# Patient Record
Sex: MALE | Race: WHITE | NOT HISPANIC OR LATINO | Employment: OTHER | RURAL
[De-identification: names, ages, dates, MRNs, and addresses within clinical notes are randomized per-mention and may not be internally consistent; named-entity substitution may affect disease eponyms.]

---

## 2020-01-01 ENCOUNTER — TELEPHONE (OUTPATIENT)
Dept: FAMILY MEDICINE CLINIC | Facility: CLINIC | Age: 85
End: 2020-01-01

## 2020-01-01 ENCOUNTER — ANESTHESIA (OUTPATIENT)
Dept: PERIOP | Facility: HOSPITAL | Age: 85
End: 2020-01-01

## 2020-01-01 ENCOUNTER — TELEMEDICINE (OUTPATIENT)
Dept: FAMILY MEDICINE CLINIC | Facility: CLINIC | Age: 85
End: 2020-01-01

## 2020-01-01 ENCOUNTER — APPOINTMENT (OUTPATIENT)
Dept: GENERAL RADIOLOGY | Facility: HOSPITAL | Age: 85
End: 2020-01-01

## 2020-01-01 ENCOUNTER — NURSING HOME (OUTPATIENT)
Dept: FAMILY MEDICINE CLINIC | Facility: CLINIC | Age: 85
End: 2020-01-01

## 2020-01-01 ENCOUNTER — HOSPITAL ENCOUNTER (INPATIENT)
Facility: HOSPITAL | Age: 85
LOS: 3 days | Discharge: NURSING, REHAB OR LONG-TERM CARE | End: 2020-10-20
Attending: FAMILY MEDICINE | Admitting: ORTHOPAEDIC SURGERY

## 2020-01-01 ENCOUNTER — APPOINTMENT (OUTPATIENT)
Dept: CARDIOLOGY | Facility: HOSPITAL | Age: 85
End: 2020-01-01

## 2020-01-01 ENCOUNTER — ANESTHESIA EVENT (OUTPATIENT)
Dept: PERIOP | Facility: HOSPITAL | Age: 85
End: 2020-01-01

## 2020-01-01 VITALS
TEMPERATURE: 98.2 F | RESPIRATION RATE: 18 BRPM | WEIGHT: 154 LBS | OXYGEN SATURATION: 99 % | HEIGHT: 68 IN | DIASTOLIC BLOOD PRESSURE: 51 MMHG | SYSTOLIC BLOOD PRESSURE: 106 MMHG | BODY MASS INDEX: 23.34 KG/M2 | HEART RATE: 98 BPM

## 2020-01-01 DIAGNOSIS — F03.91 DEMENTIA WITH BEHAVIORAL DISTURBANCE, UNSPECIFIED DEMENTIA TYPE: ICD-10-CM

## 2020-01-01 DIAGNOSIS — Z74.09 IMPAIRED FUNCTIONAL MOBILITY, BALANCE, GAIT, AND ENDURANCE: ICD-10-CM

## 2020-01-01 DIAGNOSIS — H54.8 LEGAL BLINDNESS: ICD-10-CM

## 2020-01-01 DIAGNOSIS — S72.002A LEFT DISPLACED FEMORAL NECK FRACTURE (HCC): Primary | ICD-10-CM

## 2020-01-01 DIAGNOSIS — K59.00 CONSTIPATION, UNSPECIFIED CONSTIPATION TYPE: Primary | ICD-10-CM

## 2020-01-01 DIAGNOSIS — R45.1 AGITATION: ICD-10-CM

## 2020-01-01 DIAGNOSIS — N17.9 ACUTE RENAL FAILURE, UNSPECIFIED ACUTE RENAL FAILURE TYPE (HCC): ICD-10-CM

## 2020-01-01 DIAGNOSIS — L21.9 SEBORRHEA: ICD-10-CM

## 2020-01-01 DIAGNOSIS — Z71.1 CONCERN ABOUT END OF LIFE: ICD-10-CM

## 2020-01-01 DIAGNOSIS — E87.0 HYPERNATREMIA: ICD-10-CM

## 2020-01-01 DIAGNOSIS — R26.9 GAIT DIFFICULTY: ICD-10-CM

## 2020-01-01 DIAGNOSIS — S72.002A LEFT DISPLACED FEMORAL NECK FRACTURE (HCC): ICD-10-CM

## 2020-01-01 DIAGNOSIS — H40.9 GLAUCOMA, UNSPECIFIED GLAUCOMA TYPE, UNSPECIFIED LATERALITY: ICD-10-CM

## 2020-01-01 DIAGNOSIS — Z96.642 STATUS POST LEFT HIP REPLACEMENT: ICD-10-CM

## 2020-01-01 DIAGNOSIS — I95.9 HYPOTENSION, UNSPECIFIED HYPOTENSION TYPE: ICD-10-CM

## 2020-01-01 DIAGNOSIS — R13.10 DYSPHAGIA, UNSPECIFIED TYPE: ICD-10-CM

## 2020-01-01 LAB
ABO GROUP BLD: NORMAL
ALBUMIN SERPL-MCNC: 4 G/DL (ref 3.5–5.2)
ALBUMIN/GLOB SERPL: 1.3 G/DL
ALP SERPL-CCNC: 82 U/L (ref 39–117)
ALT SERPL W P-5'-P-CCNC: 19 U/L (ref 1–41)
ANION GAP SERPL CALCULATED.3IONS-SCNC: 10 MMOL/L (ref 5–15)
ANION GAP SERPL CALCULATED.3IONS-SCNC: 11 MMOL/L (ref 5–15)
ANION GAP SERPL CALCULATED.3IONS-SCNC: 8 MMOL/L (ref 5–15)
ANION GAP SERPL CALCULATED.3IONS-SCNC: 9 MMOL/L (ref 5–15)
ARTERIAL PATENCY WRIST A: ABNORMAL
AST SERPL-CCNC: 28 U/L (ref 1–40)
ATMOSPHERIC PRESS: 757 MMHG
BASE EXCESS BLDA CALC-SCNC: -0.9 MMOL/L (ref 0–2)
BASOPHILS # BLD AUTO: 0.02 10*3/MM3 (ref 0–0.2)
BASOPHILS # BLD AUTO: 0.02 10*3/MM3 (ref 0–0.2)
BASOPHILS # BLD AUTO: 0.03 10*3/MM3 (ref 0–0.2)
BASOPHILS # BLD AUTO: 0.04 10*3/MM3 (ref 0–0.2)
BASOPHILS NFR BLD AUTO: 0.2 % (ref 0–1.5)
BDY SITE: ABNORMAL
BH CV ECHO MEAS - AO MAX PG (FULL): 0.75 MMHG
BH CV ECHO MEAS - AO MAX PG: 4.3 MMHG
BH CV ECHO MEAS - AO MEAN PG (FULL): 0 MMHG
BH CV ECHO MEAS - AO MEAN PG: 2 MMHG
BH CV ECHO MEAS - AO ROOT AREA (BSA CORRECTED): 2
BH CV ECHO MEAS - AO ROOT AREA: 10.2 CM^2
BH CV ECHO MEAS - AO ROOT DIAM: 3.6 CM
BH CV ECHO MEAS - AO V2 MAX: 104 CM/SEC
BH CV ECHO MEAS - AO V2 MEAN: 66.3 CM/SEC
BH CV ECHO MEAS - AO V2 VTI: 15.6 CM
BH CV ECHO MEAS - AVA(I,A): 2.8 CM^2
BH CV ECHO MEAS - AVA(I,D): 2.8 CM^2
BH CV ECHO MEAS - AVA(V,A): 2.9 CM^2
BH CV ECHO MEAS - AVA(V,D): 2.9 CM^2
BH CV ECHO MEAS - BSA(HAYCOCK): 1.8 M^2
BH CV ECHO MEAS - BSA: 1.8 M^2
BH CV ECHO MEAS - BZI_BMI: 23.4 KILOGRAMS/M^2
BH CV ECHO MEAS - BZI_METRIC_HEIGHT: 172.7 CM
BH CV ECHO MEAS - BZI_METRIC_WEIGHT: 69.9 KG
BH CV ECHO MEAS - EDV(CUBED): 81.2 ML
BH CV ECHO MEAS - EDV(MOD-SP4): 63.3 ML
BH CV ECHO MEAS - EDV(TEICH): 84.4 ML
BH CV ECHO MEAS - EF(CUBED): 66.1 %
BH CV ECHO MEAS - EF(MOD-SP4): 63.7 %
BH CV ECHO MEAS - EF(TEICH): 57.9 %
BH CV ECHO MEAS - ESV(CUBED): 27.5 ML
BH CV ECHO MEAS - ESV(MOD-SP4): 23 ML
BH CV ECHO MEAS - ESV(TEICH): 35.6 ML
BH CV ECHO MEAS - FS: 30.3 %
BH CV ECHO MEAS - IVS/LVPW: 1.3
BH CV ECHO MEAS - IVSD: 0.87 CM
BH CV ECHO MEAS - LA DIMENSION: 3.5 CM
BH CV ECHO MEAS - LA/AO: 0.97
BH CV ECHO MEAS - LAT PEAK E' VEL: 6.3 CM/SEC
BH CV ECHO MEAS - LV DIASTOLIC VOL/BSA (35-75): 34.6 ML/M^2
BH CV ECHO MEAS - LV MASS(C)D: 101.8 GRAMS
BH CV ECHO MEAS - LV MASS(C)DI: 55.6 GRAMS/M^2
BH CV ECHO MEAS - LV MAX PG: 3.6 MMHG
BH CV ECHO MEAS - LV MEAN PG: 2 MMHG
BH CV ECHO MEAS - LV SYSTOLIC VOL/BSA (12-30): 12.6 ML/M^2
BH CV ECHO MEAS - LV V1 MAX: 94.6 CM/SEC
BH CV ECHO MEAS - LV V1 MEAN: 60.3 CM/SEC
BH CV ECHO MEAS - LV V1 VTI: 14.1 CM
BH CV ECHO MEAS - LVIDD: 4.3 CM
BH CV ECHO MEAS - LVIDS: 3 CM
BH CV ECHO MEAS - LVLD AP4: 7.3 CM
BH CV ECHO MEAS - LVLS AP4: 6.3 CM
BH CV ECHO MEAS - LVOT AREA (M): 3.1 CM^2
BH CV ECHO MEAS - LVOT AREA: 3.1 CM^2
BH CV ECHO MEAS - LVOT DIAM: 2 CM
BH CV ECHO MEAS - LVPWD: 0.68 CM
BH CV ECHO MEAS - MED PEAK E' VEL: 8 CM/SEC
BH CV ECHO MEAS - MV A MAX VEL: 78.6 CM/SEC
BH CV ECHO MEAS - MV DEC SLOPE: 492.5 CM/SEC^2
BH CV ECHO MEAS - MV DEC TIME: 0.11 SEC
BH CV ECHO MEAS - MV E MAX VEL: 44.1 CM/SEC
BH CV ECHO MEAS - MV E/A: 0.56
BH CV ECHO MEAS - MV P1/2T MAX VEL: 53.5 CM/SEC
BH CV ECHO MEAS - MV P1/2T: 31.8 MSEC
BH CV ECHO MEAS - MVA P1/2T LCG: 4.1 CM^2
BH CV ECHO MEAS - MVA(P1/2T): 6.9 CM^2
BH CV ECHO MEAS - PA MAX PG: 11.2 MMHG
BH CV ECHO MEAS - PA V2 MAX: 167 CM/SEC
BH CV ECHO MEAS - RVDD: 3.7 CM
BH CV ECHO MEAS - SI(AO): 86.8 ML/M^2
BH CV ECHO MEAS - SI(CUBED): 29.3 ML/M^2
BH CV ECHO MEAS - SI(LVOT): 24.2 ML/M^2
BH CV ECHO MEAS - SI(MOD-SP4): 22 ML/M^2
BH CV ECHO MEAS - SI(TEICH): 26.7 ML/M^2
BH CV ECHO MEAS - SV(AO): 158.8 ML
BH CV ECHO MEAS - SV(CUBED): 53.6 ML
BH CV ECHO MEAS - SV(LVOT): 44.3 ML
BH CV ECHO MEAS - SV(MOD-SP4): 40.3 ML
BH CV ECHO MEAS - SV(TEICH): 48.9 ML
BH CV ECHO MEASUREMENTS AVERAGE E/E' RATIO: 6.17
BILIRUB SERPL-MCNC: 0.5 MG/DL (ref 0–1.2)
BILIRUB UR QL STRIP: NEGATIVE
BLD GP AB SCN SERPL QL: NEGATIVE
BODY TEMPERATURE: 37 C
BUN SERPL-MCNC: 15 MG/DL (ref 8–23)
BUN SERPL-MCNC: 18 MG/DL (ref 8–23)
BUN SERPL-MCNC: 19 MG/DL (ref 8–23)
BUN SERPL-MCNC: 22 MG/DL (ref 8–23)
BUN/CREAT SERPL: 15.8 (ref 7–25)
BUN/CREAT SERPL: 17.3 (ref 7–25)
BUN/CREAT SERPL: 21.4 (ref 7–25)
BUN/CREAT SERPL: 22 (ref 7–25)
CALCIUM SPEC-SCNC: 9.2 MG/DL (ref 8.6–10.5)
CALCIUM SPEC-SCNC: 9.4 MG/DL (ref 8.6–10.5)
CHLORIDE SERPL-SCNC: 104 MMOL/L (ref 98–107)
CHLORIDE SERPL-SCNC: 106 MMOL/L (ref 98–107)
CHLORIDE SERPL-SCNC: 110 MMOL/L (ref 98–107)
CHLORIDE SERPL-SCNC: 111 MMOL/L (ref 98–107)
CLARITY UR: CLEAR
CO2 SERPL-SCNC: 19 MMOL/L (ref 22–29)
CO2 SERPL-SCNC: 19 MMOL/L (ref 22–29)
CO2 SERPL-SCNC: 22 MMOL/L (ref 22–29)
CO2 SERPL-SCNC: 23 MMOL/L (ref 22–29)
COLOR UR: YELLOW
CREAT SERPL-MCNC: 0.84 MG/DL (ref 0.76–1.27)
CREAT SERPL-MCNC: 0.95 MG/DL (ref 0.76–1.27)
CREAT SERPL-MCNC: 1 MG/DL (ref 0.76–1.27)
CREAT SERPL-MCNC: 1.1 MG/DL (ref 0.76–1.27)
DEPRECATED RDW RBC AUTO: 47.4 FL (ref 37–54)
DEPRECATED RDW RBC AUTO: 47.8 FL (ref 37–54)
DEPRECATED RDW RBC AUTO: 48 FL (ref 37–54)
DEPRECATED RDW RBC AUTO: 48.2 FL (ref 37–54)
EOSINOPHIL # BLD AUTO: 0 10*3/MM3 (ref 0–0.4)
EOSINOPHIL # BLD AUTO: 0.01 10*3/MM3 (ref 0–0.4)
EOSINOPHIL # BLD AUTO: 0.03 10*3/MM3 (ref 0–0.4)
EOSINOPHIL # BLD AUTO: 0.18 10*3/MM3 (ref 0–0.4)
EOSINOPHIL NFR BLD AUTO: 0 % (ref 0.3–6.2)
EOSINOPHIL NFR BLD AUTO: 0.1 % (ref 0.3–6.2)
EOSINOPHIL NFR BLD AUTO: 0.2 % (ref 0.3–6.2)
EOSINOPHIL NFR BLD AUTO: 1.5 % (ref 0.3–6.2)
ERYTHROCYTE [DISTWIDTH] IN BLOOD BY AUTOMATED COUNT: 13.2 % (ref 12.3–15.4)
ERYTHROCYTE [DISTWIDTH] IN BLOOD BY AUTOMATED COUNT: 13.6 % (ref 12.3–15.4)
GFR SERPL CREATININE-BSD FRML MDRD: 63 ML/MIN/1.73
GFR SERPL CREATININE-BSD FRML MDRD: 71 ML/MIN/1.73
GFR SERPL CREATININE-BSD FRML MDRD: 75 ML/MIN/1.73
GFR SERPL CREATININE-BSD FRML MDRD: 87 ML/MIN/1.73
GLOBULIN UR ELPH-MCNC: 3 GM/DL
GLUCOSE SERPL-MCNC: 108 MG/DL (ref 65–99)
GLUCOSE SERPL-MCNC: 120 MG/DL (ref 65–99)
GLUCOSE SERPL-MCNC: 123 MG/DL (ref 65–99)
GLUCOSE SERPL-MCNC: 131 MG/DL (ref 65–99)
GLUCOSE UR STRIP-MCNC: NEGATIVE MG/DL
HCO3 BLDA-SCNC: 22 MMOL/L (ref 20–26)
HCT VFR BLD AUTO: 35.5 % (ref 37.5–51)
HCT VFR BLD AUTO: 38.6 % (ref 37.5–51)
HCT VFR BLD AUTO: 41.7 % (ref 37.5–51)
HCT VFR BLD AUTO: 43.9 % (ref 37.5–51)
HGB BLD-MCNC: 11.9 G/DL (ref 13–17.7)
HGB BLD-MCNC: 13 G/DL (ref 13–17.7)
HGB BLD-MCNC: 13.8 G/DL (ref 13–17.7)
HGB BLD-MCNC: 14.6 G/DL (ref 13–17.7)
HGB UR QL STRIP.AUTO: NEGATIVE
IMM GRANULOCYTES # BLD AUTO: 0.06 10*3/MM3 (ref 0–0.05)
IMM GRANULOCYTES # BLD AUTO: 0.07 10*3/MM3 (ref 0–0.05)
IMM GRANULOCYTES # BLD AUTO: 0.09 10*3/MM3 (ref 0–0.05)
IMM GRANULOCYTES # BLD AUTO: 0.15 10*3/MM3 (ref 0–0.05)
IMM GRANULOCYTES NFR BLD AUTO: 0.4 % (ref 0–0.5)
IMM GRANULOCYTES NFR BLD AUTO: 0.6 % (ref 0–0.5)
IMM GRANULOCYTES NFR BLD AUTO: 0.7 % (ref 0–0.5)
IMM GRANULOCYTES NFR BLD AUTO: 0.9 % (ref 0–0.5)
INHALED O2 CONCENTRATION: 21 %
KETONES UR QL STRIP: ABNORMAL
LEFT ATRIUM VOLUME INDEX: 28.5 ML/M2
LEUKOCYTE ESTERASE UR QL STRIP.AUTO: NEGATIVE
LYMPHOCYTES # BLD AUTO: 0.8 10*3/MM3 (ref 0.7–3.1)
LYMPHOCYTES # BLD AUTO: 0.91 10*3/MM3 (ref 0.7–3.1)
LYMPHOCYTES # BLD AUTO: 1.06 10*3/MM3 (ref 0.7–3.1)
LYMPHOCYTES # BLD AUTO: 1.25 10*3/MM3 (ref 0.7–3.1)
LYMPHOCYTES NFR BLD AUTO: 10.6 % (ref 19.6–45.3)
LYMPHOCYTES NFR BLD AUTO: 5.6 % (ref 19.6–45.3)
LYMPHOCYTES NFR BLD AUTO: 6.5 % (ref 19.6–45.3)
LYMPHOCYTES NFR BLD AUTO: 7.6 % (ref 19.6–45.3)
Lab: ABNORMAL
MCH RBC QN AUTO: 32 PG (ref 26.6–33)
MCH RBC QN AUTO: 32.1 PG (ref 26.6–33)
MCH RBC QN AUTO: 32.2 PG (ref 26.6–33)
MCH RBC QN AUTO: 32.3 PG (ref 26.6–33)
MCHC RBC AUTO-ENTMCNC: 33.1 G/DL (ref 31.5–35.7)
MCHC RBC AUTO-ENTMCNC: 33.3 G/DL (ref 31.5–35.7)
MCHC RBC AUTO-ENTMCNC: 33.5 G/DL (ref 31.5–35.7)
MCHC RBC AUTO-ENTMCNC: 33.7 G/DL (ref 31.5–35.7)
MCV RBC AUTO: 95.8 FL (ref 79–97)
MCV RBC AUTO: 96.2 FL (ref 79–97)
MCV RBC AUTO: 96.5 FL (ref 79–97)
MCV RBC AUTO: 96.8 FL (ref 79–97)
MODALITY: ABNORMAL
MONOCYTES # BLD AUTO: 1.36 10*3/MM3 (ref 0.1–0.9)
MONOCYTES # BLD AUTO: 1.37 10*3/MM3 (ref 0.1–0.9)
MONOCYTES # BLD AUTO: 1.46 10*3/MM3 (ref 0.1–0.9)
MONOCYTES # BLD AUTO: 1.57 10*3/MM3 (ref 0.1–0.9)
MONOCYTES NFR BLD AUTO: 12.4 % (ref 5–12)
MONOCYTES NFR BLD AUTO: 12.8 % (ref 5–12)
MONOCYTES NFR BLD AUTO: 8.3 % (ref 5–12)
MONOCYTES NFR BLD AUTO: 9.9 % (ref 5–12)
NEUTROPHILS NFR BLD AUTO: 11.31 10*3/MM3 (ref 1.7–7)
NEUTROPHILS NFR BLD AUTO: 13.85 10*3/MM3 (ref 1.7–7)
NEUTROPHILS NFR BLD AUTO: 74.7 % (ref 42.7–76)
NEUTROPHILS NFR BLD AUTO: 79.8 % (ref 42.7–76)
NEUTROPHILS NFR BLD AUTO: 8.82 10*3/MM3 (ref 1.7–7)
NEUTROPHILS NFR BLD AUTO: 81.7 % (ref 42.7–76)
NEUTROPHILS NFR BLD AUTO: 84.9 % (ref 42.7–76)
NEUTROPHILS NFR BLD AUTO: 9.75 10*3/MM3 (ref 1.7–7)
NITRITE UR QL STRIP: NEGATIVE
NRBC BLD AUTO-RTO: 0 /100 WBC (ref 0–0.2)
NT-PROBNP SERPL-MCNC: 436.3 PG/ML (ref 0–1800)
PCO2 BLDA: 31 MM HG (ref 35–45)
PCO2 TEMP ADJ BLD: 31 MM HG (ref 35–45)
PH BLDA: 7.46 PH UNITS (ref 7.35–7.45)
PH UR STRIP.AUTO: 6 [PH] (ref 5–8)
PH, TEMP CORRECTED: 7.46 PH UNITS (ref 7.35–7.45)
PLATELET # BLD AUTO: 159 10*3/MM3 (ref 140–450)
PLATELET # BLD AUTO: 167 10*3/MM3 (ref 140–450)
PLATELET # BLD AUTO: 185 10*3/MM3 (ref 140–450)
PLATELET # BLD AUTO: 189 10*3/MM3 (ref 140–450)
PMV BLD AUTO: 10.6 FL (ref 6–12)
PMV BLD AUTO: 10.8 FL (ref 6–12)
PMV BLD AUTO: 11.2 FL (ref 6–12)
PMV BLD AUTO: 12 FL (ref 6–12)
PO2 BLDA: 85.8 MM HG (ref 83–108)
PO2 TEMP ADJ BLD: 85.8 MM HG (ref 83–108)
POTASSIUM SERPL-SCNC: 3.4 MMOL/L (ref 3.5–5.2)
POTASSIUM SERPL-SCNC: 3.5 MMOL/L (ref 3.5–5.2)
POTASSIUM SERPL-SCNC: 4.1 MMOL/L (ref 3.5–5.2)
POTASSIUM SERPL-SCNC: 4.2 MMOL/L (ref 3.5–5.2)
PROT SERPL-MCNC: 7 G/DL (ref 6–8.5)
PROT UR QL STRIP: ABNORMAL
RBC # BLD AUTO: 3.69 10*6/MM3 (ref 4.14–5.8)
RBC # BLD AUTO: 4.03 10*6/MM3 (ref 4.14–5.8)
RBC # BLD AUTO: 4.31 10*6/MM3 (ref 4.14–5.8)
RBC # BLD AUTO: 4.55 10*6/MM3 (ref 4.14–5.8)
RH BLD: POSITIVE
SAO2 % BLDCOA: 98 % (ref 94–99)
SARS-COV-2 RNA PNL SPEC NAA+PROBE: NOT DETECTED
SARS-COV-2 RNA PNL SPEC NAA+PROBE: NOT DETECTED
SODIUM SERPL-SCNC: 134 MMOL/L (ref 136–145)
SODIUM SERPL-SCNC: 137 MMOL/L (ref 136–145)
SODIUM SERPL-SCNC: 138 MMOL/L (ref 136–145)
SODIUM SERPL-SCNC: 143 MMOL/L (ref 136–145)
SP GR UR STRIP: 1.02 (ref 1–1.03)
T&S EXPIRATION DATE: NORMAL
TSH SERPL DL<=0.05 MIU/L-ACNC: 2.51 UIU/ML (ref 0.27–4.2)
UROBILINOGEN UR QL STRIP: ABNORMAL
VENTILATOR MODE: ABNORMAL
WBC # BLD AUTO: 11.8 10*3/MM3 (ref 3.4–10.8)
WBC # BLD AUTO: 12.23 10*3/MM3 (ref 3.4–10.8)
WBC # BLD AUTO: 13.86 10*3/MM3 (ref 3.4–10.8)
WBC # BLD AUTO: 16.32 10*3/MM3 (ref 3.4–10.8)

## 2020-01-01 PROCEDURE — 80048 BASIC METABOLIC PNL TOTAL CA: CPT | Performed by: ORTHOPAEDIC SURGERY

## 2020-01-01 PROCEDURE — 80053 COMPREHEN METABOLIC PANEL: CPT | Performed by: FAMILY MEDICINE

## 2020-01-01 PROCEDURE — 25010000003 CEFAZOLIN PER 500 MG: Performed by: NURSE ANESTHETIST, CERTIFIED REGISTERED

## 2020-01-01 PROCEDURE — 25010000002 CEFAZOLIN PER 500 MG: Performed by: ORTHOPAEDIC SURGERY

## 2020-01-01 PROCEDURE — 25010000002 PHENYLEPHRINE HCL 0.8 MG/10ML SOLUTION PREFILLED SYRINGE: Performed by: NURSE ANESTHETIST, CERTIFIED REGISTERED

## 2020-01-01 PROCEDURE — 71045 X-RAY EXAM CHEST 1 VIEW: CPT

## 2020-01-01 PROCEDURE — 25010000002 ONDANSETRON PER 1 MG: Performed by: NURSE ANESTHETIST, CERTIFIED REGISTERED

## 2020-01-01 PROCEDURE — 25010000002 VANCOMYCIN 1 G RECONSTITUTED SOLUTION: Performed by: ORTHOPAEDIC SURGERY

## 2020-01-01 PROCEDURE — 99239 HOSP IP/OBS DSCHRG MGMT >30: CPT | Performed by: FAMILY MEDICINE

## 2020-01-01 PROCEDURE — 92610 EVALUATE SWALLOWING FUNCTION: CPT | Performed by: SPEECH-LANGUAGE PATHOLOGIST

## 2020-01-01 PROCEDURE — 25010000002 PERFLUTREN 6.52 MG/ML SUSPENSION: Performed by: FAMILY MEDICINE

## 2020-01-01 PROCEDURE — 81003 URINALYSIS AUTO W/O SCOPE: CPT | Performed by: ORTHOPAEDIC SURGERY

## 2020-01-01 PROCEDURE — 85025 COMPLETE CBC W/AUTO DIFF WBC: CPT | Performed by: ORTHOPAEDIC SURGERY

## 2020-01-01 PROCEDURE — 86901 BLOOD TYPING SEROLOGIC RH(D): CPT | Performed by: ORTHOPAEDIC SURGERY

## 2020-01-01 PROCEDURE — 36600 WITHDRAWAL OF ARTERIAL BLOOD: CPT

## 2020-01-01 PROCEDURE — 85025 COMPLETE CBC W/AUTO DIFF WBC: CPT | Performed by: FAMILY MEDICINE

## 2020-01-01 PROCEDURE — 87635 SARS-COV-2 COVID-19 AMP PRB: CPT | Performed by: FAMILY MEDICINE

## 2020-01-01 PROCEDURE — 99232 SBSQ HOSP IP/OBS MODERATE 35: CPT | Performed by: FAMILY MEDICINE

## 2020-01-01 PROCEDURE — 93306 TTE W/DOPPLER COMPLETE: CPT | Performed by: INTERNAL MEDICINE

## 2020-01-01 PROCEDURE — 99443 PR PHYS/QHP TELEPHONE EVALUATION 21-30 MIN: CPT | Performed by: FAMILY MEDICINE

## 2020-01-01 PROCEDURE — 99305 1ST NF CARE MODERATE MDM 35: CPT | Performed by: FAMILY MEDICINE

## 2020-01-01 PROCEDURE — 93010 ELECTROCARDIOGRAM REPORT: CPT | Performed by: INTERNAL MEDICINE

## 2020-01-01 PROCEDURE — 86850 RBC ANTIBODY SCREEN: CPT | Performed by: ORTHOPAEDIC SURGERY

## 2020-01-01 PROCEDURE — 93306 TTE W/DOPPLER COMPLETE: CPT

## 2020-01-01 PROCEDURE — 76000 FLUOROSCOPY <1 HR PHYS/QHP: CPT

## 2020-01-01 PROCEDURE — 0SRS0JA REPLACEMENT OF LEFT HIP JOINT, FEMORAL SURFACE WITH SYNTHETIC SUBSTITUTE, UNCEMENTED, OPEN APPROACH: ICD-10-PCS | Performed by: ORTHOPAEDIC SURGERY

## 2020-01-01 PROCEDURE — 82803 BLOOD GASES ANY COMBINATION: CPT

## 2020-01-01 PROCEDURE — 97166 OT EVAL MOD COMPLEX 45 MIN: CPT

## 2020-01-01 PROCEDURE — 73502 X-RAY EXAM HIP UNI 2-3 VIEWS: CPT

## 2020-01-01 PROCEDURE — 99222 1ST HOSP IP/OBS MODERATE 55: CPT | Performed by: INTERNAL MEDICINE

## 2020-01-01 PROCEDURE — 97162 PT EVAL MOD COMPLEX 30 MIN: CPT

## 2020-01-01 PROCEDURE — 99222 1ST HOSP IP/OBS MODERATE 55: CPT | Performed by: FAMILY MEDICINE

## 2020-01-01 PROCEDURE — 25010000002 ROPIVACAINE PER 1 MG: Performed by: NURSE ANESTHETIST, CERTIFIED REGISTERED

## 2020-01-01 PROCEDURE — 97530 THERAPEUTIC ACTIVITIES: CPT

## 2020-01-01 PROCEDURE — 25010000002 FENTANYL CITRATE (PF) 100 MCG/2ML SOLUTION: Performed by: NURSE ANESTHETIST, CERTIFIED REGISTERED

## 2020-01-01 PROCEDURE — 86900 BLOOD TYPING SEROLOGIC ABO: CPT | Performed by: ORTHOPAEDIC SURGERY

## 2020-01-01 PROCEDURE — 25010000002 DEXAMETHASONE PER 1 MG: Performed by: NURSE ANESTHETIST, CERTIFIED REGISTERED

## 2020-01-01 PROCEDURE — 83880 ASSAY OF NATRIURETIC PEPTIDE: CPT | Performed by: FAMILY MEDICINE

## 2020-01-01 PROCEDURE — 93005 ELECTROCARDIOGRAM TRACING: CPT | Performed by: ORTHOPAEDIC SURGERY

## 2020-01-01 PROCEDURE — 97110 THERAPEUTIC EXERCISES: CPT

## 2020-01-01 PROCEDURE — 99308 SBSQ NF CARE LOW MDM 20: CPT | Performed by: NURSE PRACTITIONER

## 2020-01-01 PROCEDURE — 25010000002 PROPOFOL 10 MG/ML EMULSION: Performed by: NURSE ANESTHETIST, CERTIFIED REGISTERED

## 2020-01-01 PROCEDURE — 25010000002 MORPHINE SULFATE (PF) 2 MG/ML SOLUTION: Performed by: FAMILY MEDICINE

## 2020-01-01 PROCEDURE — 80048 BASIC METABOLIC PNL TOTAL CA: CPT | Performed by: FAMILY MEDICINE

## 2020-01-01 PROCEDURE — 94799 UNLISTED PULMONARY SVC/PX: CPT

## 2020-01-01 PROCEDURE — C1776 JOINT DEVICE (IMPLANTABLE): HCPCS | Performed by: ORTHOPAEDIC SURGERY

## 2020-01-01 PROCEDURE — 84443 ASSAY THYROID STIM HORMONE: CPT | Performed by: FAMILY MEDICINE

## 2020-01-01 DEVICE — CORAIL HIP SYSTEM CEMENTLESS FEMORAL STEM 12/14 AMT 135 DEGREES KA SIZE 12 HA COATED STANDARD COLLAR
Type: IMPLANTABLE DEVICE | Status: FUNCTIONAL
Brand: CORAIL

## 2020-01-01 DEVICE — MODULAR CATHCART FRACTURE HEAD HIP BALL 52MM OD +5MM: Type: IMPLANTABLE DEVICE | Status: FUNCTIONAL

## 2020-01-01 DEVICE — MODULAR CATHCART TAPERED SPACER 12/14 TAPER -3MM: Type: IMPLANTABLE DEVICE | Status: FUNCTIONAL

## 2020-01-01 DEVICE — CAP BIPOL HIP CORAIL ACTIS: Type: IMPLANTABLE DEVICE | Status: FUNCTIONAL

## 2020-01-01 DEVICE — SEAL HEMO SURG ARISTA/AH ABS/PWDR 3GM: Type: IMPLANTABLE DEVICE | Status: FUNCTIONAL

## 2020-01-01 RX ORDER — DIVALPROEX SODIUM 125 MG/1
125 CAPSULE, COATED PELLETS ORAL EVERY MORNING
COMMUNITY

## 2020-01-01 RX ORDER — ROPIVACAINE HYDROCHLORIDE 2 MG/ML
INJECTION, SOLUTION EPIDURAL; INFILTRATION; PERINEURAL
Status: COMPLETED | OUTPATIENT
Start: 2020-01-01 | End: 2020-01-01

## 2020-01-01 RX ORDER — ONDANSETRON 4 MG/1
4 TABLET, FILM COATED ORAL EVERY 8 HOURS PRN
Qty: 20 TABLET | Refills: 1 | Status: SHIPPED | OUTPATIENT
Start: 2020-01-01

## 2020-01-01 RX ORDER — BUPIVACAINE HCL/0.9 % NACL/PF 0.1 %
2 PLASTIC BAG, INJECTION (ML) EPIDURAL EVERY 8 HOURS
Status: COMPLETED | OUTPATIENT
Start: 2020-01-01 | End: 2020-01-01

## 2020-01-01 RX ORDER — OXYCODONE AND ACETAMINOPHEN 10; 325 MG/1; MG/1
1 TABLET ORAL ONCE AS NEEDED
Status: DISCONTINUED | OUTPATIENT
Start: 2020-01-01 | End: 2020-01-01 | Stop reason: HOSPADM

## 2020-01-01 RX ORDER — NEOSTIGMINE METHYLSULFATE 5 MG/5 ML
SYRINGE (ML) INTRAVENOUS AS NEEDED
Status: DISCONTINUED | OUTPATIENT
Start: 2020-01-01 | End: 2020-01-01 | Stop reason: SURG

## 2020-01-01 RX ORDER — FENTANYL CITRATE 50 UG/ML
25 INJECTION, SOLUTION INTRAMUSCULAR; INTRAVENOUS
Status: DISCONTINUED | OUTPATIENT
Start: 2020-01-01 | End: 2020-01-01 | Stop reason: HOSPADM

## 2020-01-01 RX ORDER — ACETAMINOPHEN 325 MG/1
650 TABLET ORAL EVERY 4 HOURS PRN
Status: DISCONTINUED | OUTPATIENT
Start: 2020-01-01 | End: 2020-01-01 | Stop reason: HOSPADM

## 2020-01-01 RX ORDER — SODIUM CHLORIDE 9 MG/ML
100 INJECTION, SOLUTION INTRAVENOUS CONTINUOUS
Status: DISCONTINUED | OUTPATIENT
Start: 2020-01-01 | End: 2020-01-01

## 2020-01-01 RX ORDER — NALOXONE HCL 0.4 MG/ML
0.04 VIAL (ML) INJECTION AS NEEDED
Status: DISCONTINUED | OUTPATIENT
Start: 2020-01-01 | End: 2020-01-01 | Stop reason: HOSPADM

## 2020-01-01 RX ORDER — HYDROCODONE BITARTRATE AND ACETAMINOPHEN 5; 325 MG/1; MG/1
2 TABLET ORAL EVERY 4 HOURS PRN
Status: DISCONTINUED | OUTPATIENT
Start: 2020-01-01 | End: 2020-01-01 | Stop reason: HOSPADM

## 2020-01-01 RX ORDER — TRANEXAMIC ACID 100 MG/ML
INJECTION, SOLUTION INTRAVENOUS AS NEEDED
Status: DISCONTINUED | OUTPATIENT
Start: 2020-01-01 | End: 2020-01-01 | Stop reason: SURG

## 2020-01-01 RX ORDER — SODIUM CHLORIDE, SODIUM LACTATE, POTASSIUM CHLORIDE, CALCIUM CHLORIDE 600; 310; 30; 20 MG/100ML; MG/100ML; MG/100ML; MG/100ML
INJECTION, SOLUTION INTRAVENOUS CONTINUOUS PRN
Status: DISCONTINUED | OUTPATIENT
Start: 2020-01-01 | End: 2020-01-01 | Stop reason: SURG

## 2020-01-01 RX ORDER — NALOXONE HCL 0.4 MG/ML
0.1 VIAL (ML) INJECTION
Status: DISCONTINUED | OUTPATIENT
Start: 2020-01-01 | End: 2020-01-01 | Stop reason: SDUPTHER

## 2020-01-01 RX ORDER — FENTANYL CITRATE 50 UG/ML
INJECTION, SOLUTION INTRAMUSCULAR; INTRAVENOUS AS NEEDED
Status: DISCONTINUED | OUTPATIENT
Start: 2020-01-01 | End: 2020-01-01 | Stop reason: SURG

## 2020-01-01 RX ORDER — BISACODYL 10 MG
10 SUPPOSITORY, RECTAL RECTAL DAILY PRN
COMMUNITY

## 2020-01-01 RX ORDER — ACETAMINOPHEN 325 MG/1
650 TABLET ORAL EVERY 4 HOURS PRN
Status: DISCONTINUED | OUTPATIENT
Start: 2020-01-01 | End: 2020-01-01 | Stop reason: SDUPTHER

## 2020-01-01 RX ORDER — TRAVOPROST OPHTHALMIC SOLUTION 0.04 MG/ML
1 SOLUTION OPHTHALMIC EVERY EVENING
COMMUNITY

## 2020-01-01 RX ORDER — NALOXONE HCL 0.4 MG/ML
0.4 VIAL (ML) INJECTION
Status: DISCONTINUED | OUTPATIENT
Start: 2020-01-01 | End: 2020-01-01 | Stop reason: HOSPADM

## 2020-01-01 RX ORDER — DIVALPROEX SODIUM 125 MG/1
250 TABLET, DELAYED RELEASE ORAL NIGHTLY
Status: DISCONTINUED | OUTPATIENT
Start: 2020-01-01 | End: 2020-01-01 | Stop reason: HOSPADM

## 2020-01-01 RX ORDER — PROPOFOL 10 MG/ML
VIAL (ML) INTRAVENOUS AS NEEDED
Status: DISCONTINUED | OUTPATIENT
Start: 2020-01-01 | End: 2020-01-01 | Stop reason: SURG

## 2020-01-01 RX ORDER — ACETAMINOPHEN 650 MG/1
650 SUPPOSITORY RECTAL EVERY 4 HOURS PRN
Status: DISCONTINUED | OUTPATIENT
Start: 2020-01-01 | End: 2020-01-01 | Stop reason: HOSPADM

## 2020-01-01 RX ORDER — DIVALPROEX SODIUM 125 MG/1
250 CAPSULE, COATED PELLETS ORAL NIGHTLY
COMMUNITY

## 2020-01-01 RX ORDER — BISACODYL 5 MG/1
10 TABLET, DELAYED RELEASE ORAL DAILY PRN
Status: DISCONTINUED | OUTPATIENT
Start: 2020-01-01 | End: 2020-01-01 | Stop reason: HOSPADM

## 2020-01-01 RX ORDER — ROCURONIUM BROMIDE 10 MG/ML
INJECTION, SOLUTION INTRAVENOUS AS NEEDED
Status: DISCONTINUED | OUTPATIENT
Start: 2020-01-01 | End: 2020-01-01 | Stop reason: SURG

## 2020-01-01 RX ORDER — NALOXONE HCL 0.4 MG/ML
0.4 VIAL (ML) INJECTION
Status: DISCONTINUED | OUTPATIENT
Start: 2020-01-01 | End: 2020-01-01 | Stop reason: SDUPTHER

## 2020-01-01 RX ORDER — HYDROCODONE BITARTRATE AND ACETAMINOPHEN 5; 325 MG/1; MG/1
1 TABLET ORAL EVERY 4 HOURS PRN
Qty: 60 TABLET | Refills: 0 | Status: SHIPPED | OUTPATIENT
Start: 2020-01-01 | End: 2020-11-15

## 2020-01-01 RX ORDER — VANCOMYCIN HYDROCHLORIDE 1 G/20ML
INJECTION, POWDER, LYOPHILIZED, FOR SOLUTION INTRAVENOUS AS NEEDED
Status: DISCONTINUED | OUTPATIENT
Start: 2020-01-01 | End: 2020-01-01 | Stop reason: HOSPADM

## 2020-01-01 RX ORDER — ACETAMINOPHEN 325 MG/1
650 TABLET ORAL EVERY 4 HOURS PRN
COMMUNITY

## 2020-01-01 RX ORDER — DIVALPROEX SODIUM 125 MG/1
125 TABLET, DELAYED RELEASE ORAL EVERY MORNING
Status: DISCONTINUED | OUTPATIENT
Start: 2020-01-01 | End: 2020-01-01 | Stop reason: HOSPADM

## 2020-01-01 RX ORDER — DONEPEZIL HYDROCHLORIDE 10 MG/1
10 TABLET, FILM COATED ORAL NIGHTLY
Status: ON HOLD | COMMUNITY
End: 2020-01-01

## 2020-01-01 RX ORDER — MAGNESIUM HYDROXIDE/ALUMINUM HYDROXICE/SIMETHICONE 120; 1200; 1200 MG/30ML; MG/30ML; MG/30ML
30 SUSPENSION ORAL EVERY 4 HOURS PRN
COMMUNITY

## 2020-01-01 RX ORDER — DONEPEZIL HYDROCHLORIDE 10 MG/1
10 TABLET, FILM COATED ORAL NIGHTLY
Status: DISCONTINUED | OUTPATIENT
Start: 2020-01-01 | End: 2020-01-01

## 2020-01-01 RX ORDER — HYDROCODONE BITARTRATE AND ACETAMINOPHEN 5; 325 MG/1; MG/1
1 TABLET ORAL EVERY 4 HOURS PRN
Status: DISCONTINUED | OUTPATIENT
Start: 2020-01-01 | End: 2020-01-01 | Stop reason: HOSPADM

## 2020-01-01 RX ORDER — HYDROCODONE BITARTRATE AND ACETAMINOPHEN 5; 325 MG/1; MG/1
1 TABLET ORAL EVERY 4 HOURS PRN
Qty: 60 TABLET | Refills: 0 | Status: SHIPPED | OUTPATIENT
Start: 2020-01-01 | End: 2020-01-01 | Stop reason: SDUPTHER

## 2020-01-01 RX ORDER — FLUMAZENIL 0.1 MG/ML
0.2 INJECTION INTRAVENOUS AS NEEDED
Status: DISCONTINUED | OUTPATIENT
Start: 2020-01-01 | End: 2020-01-01 | Stop reason: HOSPADM

## 2020-01-01 RX ORDER — MULTIPLE VITAMINS W/ MINERALS TAB 9MG-400MCG
1 TAB ORAL DAILY
COMMUNITY

## 2020-01-01 RX ORDER — PSEUDOEPHEDRINE HCL 30 MG
100 TABLET ORAL 2 TIMES DAILY
Start: 2020-01-01

## 2020-01-01 RX ORDER — ONDANSETRON 2 MG/ML
4 INJECTION INTRAMUSCULAR; INTRAVENOUS EVERY 6 HOURS PRN
Status: DISCONTINUED | OUTPATIENT
Start: 2020-01-01 | End: 2020-01-01 | Stop reason: HOSPADM

## 2020-01-01 RX ORDER — SODIUM CHLORIDE 0.9 % (FLUSH) 0.9 %
10 SYRINGE (ML) INJECTION EVERY 12 HOURS SCHEDULED
Status: DISCONTINUED | OUTPATIENT
Start: 2020-01-01 | End: 2020-01-01 | Stop reason: HOSPADM

## 2020-01-01 RX ORDER — MORPHINE SULFATE 2 MG/ML
2 INJECTION, SOLUTION INTRAMUSCULAR; INTRAVENOUS
Status: DISCONTINUED | OUTPATIENT
Start: 2020-01-01 | End: 2020-01-01 | Stop reason: HOSPADM

## 2020-01-01 RX ORDER — BISACODYL 10 MG
10 SUPPOSITORY, RECTAL RECTAL DAILY PRN
Status: DISCONTINUED | OUTPATIENT
Start: 2020-01-01 | End: 2020-01-01 | Stop reason: HOSPADM

## 2020-01-01 RX ORDER — LATANOPROST 50 UG/ML
1 SOLUTION/ DROPS OPHTHALMIC NIGHTLY
Status: DISCONTINUED | OUTPATIENT
Start: 2020-01-01 | End: 2020-01-01 | Stop reason: HOSPADM

## 2020-01-01 RX ORDER — PHENYLEPHRINE HCL IN 0.9% NACL 0.8MG/10ML
SYRINGE (ML) INTRAVENOUS AS NEEDED
Status: DISCONTINUED | OUTPATIENT
Start: 2020-01-01 | End: 2020-01-01 | Stop reason: SURG

## 2020-01-01 RX ORDER — SODIUM CHLORIDE 0.9 % (FLUSH) 0.9 %
3 SYRINGE (ML) INJECTION EVERY 12 HOURS SCHEDULED
Status: DISCONTINUED | OUTPATIENT
Start: 2020-01-01 | End: 2020-01-01 | Stop reason: HOSPADM

## 2020-01-01 RX ORDER — DOCUSATE SODIUM 100 MG/1
100 CAPSULE, LIQUID FILLED ORAL 2 TIMES DAILY
Qty: 60 CAPSULE | Refills: 1 | Status: SHIPPED | OUTPATIENT
Start: 2020-01-01

## 2020-01-01 RX ORDER — SODIUM CHLORIDE 0.9 % (FLUSH) 0.9 %
10 SYRINGE (ML) INJECTION AS NEEDED
Status: DISCONTINUED | OUTPATIENT
Start: 2020-01-01 | End: 2020-01-01 | Stop reason: HOSPADM

## 2020-01-01 RX ORDER — LABETALOL HYDROCHLORIDE 5 MG/ML
5 INJECTION, SOLUTION INTRAVENOUS
Status: DISCONTINUED | OUTPATIENT
Start: 2020-01-01 | End: 2020-01-01 | Stop reason: HOSPADM

## 2020-01-01 RX ORDER — HYDROMORPHONE HYDROCHLORIDE 1 MG/ML
0.5 INJECTION, SOLUTION INTRAMUSCULAR; INTRAVENOUS; SUBCUTANEOUS
Status: DISCONTINUED | OUTPATIENT
Start: 2020-01-01 | End: 2020-01-01 | Stop reason: ALTCHOICE

## 2020-01-01 RX ORDER — ONDANSETRON 2 MG/ML
4 INJECTION INTRAMUSCULAR; INTRAVENOUS AS NEEDED
Status: DISCONTINUED | OUTPATIENT
Start: 2020-01-01 | End: 2020-01-01 | Stop reason: HOSPADM

## 2020-01-01 RX ORDER — ONDANSETRON 2 MG/ML
INJECTION INTRAMUSCULAR; INTRAVENOUS AS NEEDED
Status: DISCONTINUED | OUTPATIENT
Start: 2020-01-01 | End: 2020-01-01 | Stop reason: SURG

## 2020-01-01 RX ORDER — KETOCONAZOLE 20 MG/ML
1 SHAMPOO TOPICAL EVERY 12 HOURS PRN
COMMUNITY

## 2020-01-01 RX ORDER — MAGNESIUM HYDROXIDE 1200 MG/15ML
LIQUID ORAL AS NEEDED
Status: DISCONTINUED | OUTPATIENT
Start: 2020-01-01 | End: 2020-01-01 | Stop reason: HOSPADM

## 2020-01-01 RX ORDER — OXYCODONE AND ACETAMINOPHEN 7.5; 325 MG/1; MG/1
2 TABLET ORAL ONCE AS NEEDED
Status: DISCONTINUED | OUTPATIENT
Start: 2020-01-01 | End: 2020-01-01 | Stop reason: HOSPADM

## 2020-01-01 RX ORDER — POLYETHYLENE GLYCOL 3350 17 G/17G
17 POWDER, FOR SOLUTION ORAL DAILY PRN
Status: DISCONTINUED | OUTPATIENT
Start: 2020-01-01 | End: 2020-01-01 | Stop reason: HOSPADM

## 2020-01-01 RX ORDER — POTASSIUM CHLORIDE 750 MG/1
2 CAPSULE, EXTENDED RELEASE ORAL DAILY
COMMUNITY

## 2020-01-01 RX ORDER — CEFAZOLIN SODIUM 1 G/3ML
INJECTION, POWDER, FOR SOLUTION INTRAMUSCULAR; INTRAVENOUS AS NEEDED
Status: DISCONTINUED | OUTPATIENT
Start: 2020-01-01 | End: 2020-01-01 | Stop reason: SURG

## 2020-01-01 RX ORDER — MORPHINE SULFATE 2 MG/ML
1 INJECTION, SOLUTION INTRAMUSCULAR; INTRAVENOUS EVERY 4 HOURS PRN
Status: DISCONTINUED | OUTPATIENT
Start: 2020-01-01 | End: 2020-01-01 | Stop reason: SDUPTHER

## 2020-01-01 RX ORDER — BUPIVACAINE HCL/0.9 % NACL/PF 0.1 %
2 PLASTIC BAG, INJECTION (ML) EPIDURAL ONCE
Status: CANCELLED | OUTPATIENT
Start: 2020-01-01 | End: 2020-01-01

## 2020-01-01 RX ORDER — ONDANSETRON 4 MG/1
4 TABLET, FILM COATED ORAL EVERY 6 HOURS PRN
Status: DISCONTINUED | OUTPATIENT
Start: 2020-01-01 | End: 2020-01-01 | Stop reason: HOSPADM

## 2020-01-01 RX ORDER — DEXAMETHASONE SODIUM PHOSPHATE 4 MG/ML
INJECTION, SOLUTION INTRA-ARTICULAR; INTRALESIONAL; INTRAMUSCULAR; INTRAVENOUS; SOFT TISSUE AS NEEDED
Status: DISCONTINUED | OUTPATIENT
Start: 2020-01-01 | End: 2020-01-01 | Stop reason: SURG

## 2020-01-01 RX ORDER — DOCUSATE SODIUM 100 MG/1
100 CAPSULE, LIQUID FILLED ORAL 2 TIMES DAILY
Status: DISCONTINUED | OUTPATIENT
Start: 2020-01-01 | End: 2020-01-01 | Stop reason: HOSPADM

## 2020-01-01 RX ADMIN — Medication 3 MG: at 10:40

## 2020-01-01 RX ADMIN — CEFAZOLIN SODIUM 2 G: 10 INJECTION, POWDER, FOR SOLUTION INTRAVENOUS at 17:12

## 2020-01-01 RX ADMIN — LIDOCAINE HYDROCHLORIDE 100 MG: 20 INJECTION, SOLUTION INTRAVENOUS at 09:33

## 2020-01-01 RX ADMIN — FENTANYL CITRATE 25 MCG: 50 INJECTION, SOLUTION INTRAMUSCULAR; INTRAVENOUS at 10:25

## 2020-01-01 RX ADMIN — BISACODYL 10 MG: 10 SUPPOSITORY RECTAL at 09:58

## 2020-01-01 RX ADMIN — FENTANYL CITRATE 25 MCG: 50 INJECTION, SOLUTION INTRAMUSCULAR; INTRAVENOUS at 10:15

## 2020-01-01 RX ADMIN — SODIUM CHLORIDE 100 ML/HR: 9 INJECTION, SOLUTION INTRAVENOUS at 12:05

## 2020-01-01 RX ADMIN — DIVALPROEX SODIUM 125 MG: 125 TABLET, DELAYED RELEASE ORAL at 08:45

## 2020-01-01 RX ADMIN — VASOPRESSIN 1 ML: 20 INJECTION INTRAVENOUS at 10:45

## 2020-01-01 RX ADMIN — SODIUM CHLORIDE, PRESERVATIVE FREE 3 ML: 5 INJECTION INTRAVENOUS at 21:48

## 2020-01-01 RX ADMIN — SODIUM CHLORIDE, POTASSIUM CHLORIDE, SODIUM LACTATE AND CALCIUM CHLORIDE: 600; 310; 30; 20 INJECTION, SOLUTION INTRAVENOUS at 10:30

## 2020-01-01 RX ADMIN — SODIUM CHLORIDE, PRESERVATIVE FREE 10 ML: 5 INJECTION INTRAVENOUS at 21:47

## 2020-01-01 RX ADMIN — LATANOPROST 1 DROP: 50 SOLUTION OPHTHALMIC at 21:47

## 2020-01-01 RX ADMIN — VASOPRESSIN 1 ML: 20 INJECTION INTRAVENOUS at 10:26

## 2020-01-01 RX ADMIN — SENNOSIDES 10 ML: 8.8 SYRUP ORAL at 20:48

## 2020-01-01 RX ADMIN — LATANOPROST 1 DROP: 50 SOLUTION OPHTHALMIC at 20:48

## 2020-01-01 RX ADMIN — DIVALPROEX SODIUM 250 MG: 125 TABLET, DELAYED RELEASE ORAL at 21:46

## 2020-01-01 RX ADMIN — APIXABAN 2.5 MG: 2.5 TABLET, FILM COATED ORAL at 08:45

## 2020-01-01 RX ADMIN — POLYETHYLENE GLYCOL (3350) 17 G: 17 POWDER, FOR SOLUTION ORAL at 20:47

## 2020-01-01 RX ADMIN — DIVALPROEX SODIUM 125 MG: 125 TABLET, DELAYED RELEASE ORAL at 09:59

## 2020-01-01 RX ADMIN — PROPOFOL 150 MG: 10 INJECTION, EMULSION INTRAVENOUS at 09:33

## 2020-01-01 RX ADMIN — PERFLUTREN 8.48 MG: 6.52 INJECTION, SUSPENSION INTRAVENOUS at 15:21

## 2020-01-01 RX ADMIN — ACETAMINOPHEN 650 MG: 650 SUPPOSITORY RECTAL at 16:28

## 2020-01-01 RX ADMIN — ROPIVACAINE HYDROCHLORIDE 40 ML: 2 INJECTION, SOLUTION EPIDURAL; INFILTRATION at 10:54

## 2020-01-01 RX ADMIN — Medication 240 MCG: at 09:37

## 2020-01-01 RX ADMIN — SODIUM CHLORIDE 100 ML/HR: 9 INJECTION, SOLUTION INTRAVENOUS at 21:44

## 2020-01-01 RX ADMIN — DIVALPROEX SODIUM 250 MG: 125 TABLET, DELAYED RELEASE ORAL at 20:48

## 2020-01-01 RX ADMIN — CEFAZOLIN SODIUM 2 G: 10 INJECTION, POWDER, FOR SOLUTION INTRAVENOUS at 01:33

## 2020-01-01 RX ADMIN — DOCUSATE SODIUM 100 MG: 100 CAPSULE ORAL at 08:45

## 2020-01-01 RX ADMIN — DEXAMETHASONE SODIUM PHOSPHATE 8 MG: 4 INJECTION, SOLUTION INTRAMUSCULAR; INTRAVENOUS at 10:01

## 2020-01-01 RX ADMIN — VASOPRESSIN 1 ML: 20 INJECTION INTRAVENOUS at 10:19

## 2020-01-01 RX ADMIN — APIXABAN 2.5 MG: 2.5 TABLET, FILM COATED ORAL at 20:50

## 2020-01-01 RX ADMIN — TRANEXAMIC ACID 1000 MG: 100 INJECTION, SOLUTION INTRAVENOUS at 10:40

## 2020-01-01 RX ADMIN — MORPHINE SULFATE 1 MG: 2 INJECTION, SOLUTION INTRAMUSCULAR; INTRAVENOUS at 14:43

## 2020-01-01 RX ADMIN — FENTANYL CITRATE 50 MCG: 50 INJECTION, SOLUTION INTRAMUSCULAR; INTRAVENOUS at 09:29

## 2020-01-01 RX ADMIN — SODIUM CHLORIDE, PRESERVATIVE FREE 10 ML: 5 INJECTION INTRAVENOUS at 21:48

## 2020-01-01 RX ADMIN — GLYCOPYRROLATE 0.4 MG: 0.2 INJECTION, SOLUTION INTRAMUSCULAR; INTRAVENOUS at 10:40

## 2020-01-01 RX ADMIN — CEFAZOLIN 2 G: 330 INJECTION, POWDER, FOR SOLUTION INTRAMUSCULAR; INTRAVENOUS at 09:50

## 2020-01-01 RX ADMIN — ONDANSETRON HYDROCHLORIDE 4 MG: 2 SOLUTION INTRAMUSCULAR; INTRAVENOUS at 10:40

## 2020-01-01 RX ADMIN — SODIUM CHLORIDE, POTASSIUM CHLORIDE, SODIUM LACTATE AND CALCIUM CHLORIDE: 600; 310; 30; 20 INJECTION, SOLUTION INTRAVENOUS at 09:27

## 2020-01-01 RX ADMIN — SODIUM CHLORIDE, PRESERVATIVE FREE 10 ML: 5 INJECTION INTRAVENOUS at 13:20

## 2020-01-01 RX ADMIN — TRANEXAMIC ACID 1000 MG: 100 INJECTION, SOLUTION INTRAVENOUS at 09:51

## 2020-01-01 RX ADMIN — DOCUSATE SODIUM 100 MG: 100 CAPSULE ORAL at 09:59

## 2020-01-01 RX ADMIN — SODIUM CHLORIDE 100 ML/HR: 9 INJECTION, SOLUTION INTRAVENOUS at 13:20

## 2020-01-01 RX ADMIN — DIVALPROEX SODIUM 250 MG: 125 TABLET, DELAYED RELEASE ORAL at 21:44

## 2020-01-01 RX ADMIN — HYDROCODONE BITARTRATE AND ACETAMINOPHEN 1 TABLET: 5; 325 TABLET ORAL at 13:20

## 2020-01-01 RX ADMIN — MORPHINE SULFATE 1 MG: 2 INJECTION, SOLUTION INTRAMUSCULAR; INTRAVENOUS at 23:35

## 2020-01-01 RX ADMIN — MAGNESIUM HYDROXIDE 10 ML: 2400 SUSPENSION ORAL at 20:49

## 2020-01-01 RX ADMIN — DOCUSATE SODIUM 100 MG: 100 CAPSULE ORAL at 20:48

## 2020-01-01 RX ADMIN — VASOPRESSIN 1 ML: 20 INJECTION INTRAVENOUS at 09:52

## 2020-01-01 RX ADMIN — VASOPRESSIN 2 ML: 20 INJECTION INTRAVENOUS at 09:43

## 2020-01-01 RX ADMIN — APIXABAN 2.5 MG: 2.5 TABLET, FILM COATED ORAL at 09:58

## 2020-01-01 RX ADMIN — DIVALPROEX SODIUM 125 MG: 125 TABLET, DELAYED RELEASE ORAL at 14:42

## 2020-01-01 RX ADMIN — SODIUM CHLORIDE 100 ML/HR: 9 INJECTION, SOLUTION INTRAVENOUS at 06:00

## 2020-01-01 RX ADMIN — ROCURONIUM BROMIDE 30 MG: 10 INJECTION INTRAVENOUS at 09:33

## 2020-09-20 PROBLEM — H54.8 LEGAL BLINDNESS: Status: ACTIVE | Noted: 2020-01-01

## 2020-09-20 PROBLEM — Z01.89 LABORATORY TEST: Status: ACTIVE | Noted: 2020-01-01

## 2020-09-20 PROBLEM — K59.00 CONSTIPATION: Status: ACTIVE | Noted: 2020-01-01

## 2020-09-20 PROBLEM — R26.9 GAIT DIFFICULTY: Status: ACTIVE | Noted: 2020-01-01

## 2020-09-20 PROBLEM — R45.1 AGITATION: Status: ACTIVE | Noted: 2020-01-01

## 2020-09-20 PROBLEM — L21.9 SEBORRHEA: Status: ACTIVE | Noted: 2020-01-01

## 2020-09-20 PROBLEM — F03.90 DEMENTIA (HCC): Status: ACTIVE | Noted: 2020-01-01

## 2020-09-20 PROBLEM — H40.9 GLAUCOMA: Status: ACTIVE | Noted: 2020-01-01

## 2020-09-20 NOTE — PROGRESS NOTES
[facility]  Date of service: 9.18.20    Subjective   Gabe Melendez is a 86 y.o. male presenting with chief complaint of:   Regulatory visit/initial visit.     History of Present Illness :  Alone.  Has multiple chronic problems; interval visit/regulatory visit.  One of these problems is dementia.  This obviously is reached a point where family cannot care for him at home especially when combined with his other problems.  Transfer from [facility] closed     Chronic/acute problems reviewed today:   1. Constipation, unspecified constipation type    2. Dementia with behavioral disturbance, unspecified dementia type (CMS/HCC)    3. Seborrhea    4. Nursing home resident    5. Gait difficulty    6. Legal blindness    7. Agitation    8. Glaucoma, unspecified glaucoma type, unspecified laterality      Has an/another acute issue today: none.    The following portions of the patient's history were reviewed and updated as appropriate: allergies, current medications, past family history, past medical history, past social history, past surgical history and problem list.      Current Outpatient Medications:   •  acetaminophen (TYLENOL) 325 MG tablet, Take 650 mg by mouth Every 4 (Four) Hours As Needed for Fever or Pain. Not to exceed 3 gm in 24 hr, Disp: , Rfl:   •  aluminum-magnesium hydroxide-simethicone (MAALOX/MYLANTA) 200-200-20 MG/5ML suspension, Take 30 mL by mouth Every 4 (Four) Hours As Needed for Indigestion., Disp: , Rfl:   •  bisacodyl (DULCOLAX) 10 MG suppository, Insert 10 mg into the rectum Daily As Needed for Constipation. If no bm from MOM, Disp: , Rfl:   •  divalproex (DEPAKOTE) 125 MG DR tablet, Take 125 mg by mouth Every Morning., Disp: , Rfl:   •  divalproex (DEPAKOTE) 125 MG DR tablet, Take 2 tablets by mouth Every Night., Disp: , Rfl:   •  donepezil (ARICEPT) 10 MG tablet, Take 10 mg by mouth Every Night., Disp: , Rfl:   •  ketoconazole (NIZORAL) 2 % shampoo, Apply  topically to the  appropriate area as directed Every 12 (Twelve) Hours As Needed for Dry Skin. To scalp for dry scalp, Disp: , Rfl:   •  magnesium hydroxide (MILK OF MAGNESIA) 400 MG/5ML suspension, Take 30 mL by mouth Daily As Needed for Constipation. If no bm in 3 days, Disp: , Rfl:   •  potassium chloride (MICRO-K) 10 MEQ CR capsule, Take 2 capsules by mouth Daily., Disp: , Rfl:   •  Prenatal Vit-Fe Fumarate-FA (M-VIT PO), Take 1 tablet by mouth Daily., Disp: , Rfl:   •  travoprost, BAK free, (TRAVATAN) 0.004 % solution ophthalmic solution, Administer 1 drop to both eyes Every Evening., Disp: , Rfl:     Allergies   Allergen Reactions   • Tetracyclines & Related Nausea Only     Unknown?       Review of Systems   Constitutional: Negative for activity change and fever.   HENT: Negative for congestion, drooling, mouth sores, nosebleeds and trouble swallowing.    Eyes: Positive for visual disturbance. Negative for pain, discharge and redness.   Respiratory: Negative for cough, choking, shortness of breath and wheezing.    Cardiovascular: Negative for palpitations and leg swelling.   Gastrointestinal: Negative for abdominal pain, blood in stool, constipation, diarrhea and vomiting.   Genitourinary: Negative for dysuria.   Musculoskeletal: Positive for gait problem. Negative for back pain and joint swelling.   Skin: Negative for rash and wound.   Neurological: Negative for tremors and facial asymmetry.   Psychiatric/Behavioral: Positive for behavioral problems and confusion. Negative for agitation and self-injury.       LABS:   No results found for this or any previous visit.      Objective   Vitals:  Wt: 137 Ht: 67  Temp 98.2 oral.  Pulse 68/regular.  RR: 18/unlabored.  /72 sitting    Physical Exam  Vitals signs and nursing note reviewed.   Constitutional:       General: He is not in acute distress.     Appearance: Normal appearance. He is not ill-appearing.   HENT:      Head: Normocephalic and atraumatic.      Right Ear: External  ear normal.      Left Ear: External ear normal.      Nose: Nose normal. No congestion or rhinorrhea.      Mouth/Throat:      Mouth: Mucous membranes are moist.   Eyes:      Extraocular Movements: Extraocular movements intact.      Conjunctiva/sclera: Conjunctivae normal.      Pupils: Pupils are equal, round, and reactive to light.   Neck:      Musculoskeletal: Neck supple.   Cardiovascular:      Rate and Rhythm: Normal rate and regular rhythm.      Pulses: Normal pulses.      Heart sounds: No murmur. No gallop.    Pulmonary:      Effort: Pulmonary effort is normal. No respiratory distress.      Breath sounds: No wheezing.   Abdominal:      General: There is no distension.      Palpations: There is no mass.      Tenderness: There is no abdominal tenderness.   Musculoskeletal:         General: No swelling, tenderness, deformity or signs of injury.      Right lower leg: No edema.      Left lower leg: No edema.   Skin:     Findings: No bruising, erythema or rash.   Neurological:      General: No focal deficit present.      Mental Status: He is alert. He is disoriented.   Psychiatric:      Comments: Will intermittently on a continuous basis stand up from his wheelchair; and with staff suggestion with sit back down.    No purposeful conversation       Assessment/Plan   Nursing home resident-regulatory visit  1. Constipation, unspecified constipation type    2. Dementia with behavioral disturbance, unspecified dementia type (CMS/HCC)    3. Seborrhea    4. Nursing home resident    5. Gait difficulty    6. Legal blindness    7. Agitation    8. Glaucoma, unspecified glaucoma type, unspecified laterality      Rx: reviewed/changes:  No orders of the defined types were placed in this encounter.    LAB/Testing/Referrals: reviewed/orders:   Today:   No orders of the defined types were placed in this encounter.    Chronic/recurrent labs above or change to:   Initial labs consider  6m CBC, CMP, TSH  12m CBC, CMP, LIPID, TSH, fT4,  iron, Vit B12, folate     Discussions:   Initial labs/establish labs  Same/current other orders for now  Develop data base  PT, OT evals  Establish code status    Follow up: two months unless acute issue before.

## 2020-09-25 NOTE — PROGRESS NOTES
Nursing Facility Progress Note      Paul Ramirez, APRN  [address] [address]  Phone: [phone]  Fax: [phone]      PATIENT NAME: Gabe Melendez                                                                          YOB: 1934            DATE OF SERVICE: 09/24/2020  FACILITY: [facility]     CHIEF COMPLAINT:  Regular nursing facility visit      HISTORY OF PRESENT ILLNESS:   This 86 y.o. male was seen at the nursing facility today for routine rounding review of acute and chronic problems.      Legal blindness: Requires assistance with a daily ADLs.    Debility, gait problem present.    Dementia: No acute behavior disturbances reported by staff.    PAST MEDICAL & SURGICAL HISTORY:   No past medical history on file.   No past surgical history on file.     MEDICATIONS:  I have reviewed and reconciled the patients medication list in the patients chart at the skilled nursing facility today.      ALLERGIES:  Allergies   Allergen Reactions   • Tetracyclines & Related Nausea Only     Unknown?       SOCIAL HISTORY:  Social History     Socioeconomic History   • Marital status:      Spouse name: Not on file   • Number of children: Not on file   • Years of education: Not on file   • Highest education level: Not on file     FAMILY HISTORY:  No family history on file.  REVIEW OF SYSTEMS:  Review of Systems   Constitutional: Negative for chills and fever.   Eyes: Positive for visual disturbance (legal blindness).   Respiratory: Negative for cough and shortness of breath.    Cardiovascular: Negative for chest pain and palpitations.   Musculoskeletal: Positive for gait problem (chronic). Negative for arthralgias and myalgias.     PHYSICAL EXAMINATION:   VITAL SIGNS: Blood pressure 132/78 temperature 98.6 pulse 70 respiratory rate 20  Physical Exam  Constitutional:       General: He is not in acute distress.  Cardiovascular:      Rate and Rhythm: Normal rate and  regular rhythm.   Pulmonary:      Effort: Pulmonary effort is normal.      Breath sounds: Normal breath sounds.   Musculoskeletal:      Comments: Lower extremity weakness affecting gait.   Skin:     General: Skin is warm and dry.       RECORDS REVIEW:   I have reviewed and interpreted any labs, xrays, and diagnostic tests available today.    ASSESSMENT   Diagnoses and all orders for this visit:    1. Nursing home resident (Primary)    2. Gait difficulty    3. Legal blindness    4. Dementia with behavioral disturbance, unspecified dementia type (CMS/[facility])    PLAN  1.  Discussed Patient in detail with nursing/staff, addressed all needs today.     2.  Plan of Care Reviewed   3.  PT/OT/SLP Reviewed  4.  Order Changes   -No new orders   5.  Discharge Plans Reviewed - No immediate plan to discharge.  6.  Advance Directive on file with nursing facility.   7.  POA on file with nursing facility.   8.  Code Status listed: []  Full Code   [x]  DNR     9.  Review labs, xrays, diagnostics in realtime as office receives results.    TARIQ Kang spent a total face to face time spent with patient 15 minutes. Of which 15 minutes where in counseling the patient and family.    Note e-Signed by TARIQ Kang on 09/24/2020 at 08:52 CDT

## 2020-10-17 PROBLEM — S72.009A HIP FRACTURE (HCC): Status: ACTIVE | Noted: 2020-01-01

## 2020-10-17 PROBLEM — S72.002A LEFT DISPLACED FEMORAL NECK FRACTURE (HCC): Status: ACTIVE | Noted: 2020-01-01

## 2020-10-17 PROBLEM — Z00.00 WELLNESS EXAMINATION: Status: ACTIVE | Noted: 2020-01-01

## 2020-10-17 NOTE — THERAPY DISCHARGE NOTE
Acute Care - Speech Language Pathology   Swallow Initial Evaluation/Discharge  [place]     Patient Name: Gabe Melendez  : 1934  MRN: [id number]  Today's Date: 10/17/2020               Admit Date: 10/17/2020  ST clinical bedside swallow evalution completed. Pt admitted for acute hip fracture. Hx of dementia and legally blind. Pt resides at a SNF and on regular solids diet with thin liquids at SNF as well as magic cup for weight support per SNF notes. Meds crushed in applesauce per SNF reports. Pt is legally blind and requires 1:1 feeding. Pt presented with full range of consistencies except mech soft. No overt s/s of aspiration noted with PO trials. Pt okay to resume regular solids diet with thin liquids. Meds crushed in applesauce. ST cannot fully r/o aspiration with PO. RN to continue to monitor for any increased lung congestion. ST services no longer warranted. MD to reconsult if change or new concern.   Reina Le CCC-SLP 10/17/2020 15:00 CDT      Visit Dx:    ICD-10-CM ICD-9-CM   1. Dysphagia, unspecified type  R13.10 787.20     Patient Active Problem List   Diagnosis   • Nursing home resident   • Gait difficulty   • Legal blindness   • Dementia (CMS/HCC)   • Agitation   • Seborrhea   • Glaucoma   • Constipation   • Laboratory test   • Hip fracture (CMS/HCC)   • Wellness examination     History reviewed. No pertinent past medical history.  History reviewed. No pertinent surgical history.       SWALLOW EVALUATION (last 72 hours)      SLP Adult Swallow Evaluation     Row Name 10/17/20 8166                   Rehab Evaluation    Document Type  evaluation  -BN        Subjective Information  complains of;pain  -BN        Patient Observations  alert;cooperative  -BN        Patient/Family/Caregiver Comments/Observations  pt spouse at bedside  -BN        Care Plan Review  evaluation/treatment results reviewed;care plan/treatment goals reviewed;risks/benefits reviewed;current/potential barriers  reviewed;patient/other agree to care plan  -BN        Care Plan Review, Other Participant(s)  spouse;caregiver;other (see comments) RNShruthi  -BN        Patient Effort  good  -BN           General Information    Patient Profile Reviewed  yes  -BN        Pertinent History Of Current Problem  acute hip fracture. Hx of dementia and legally blind.   -BN        Current Method of Nutrition  NPO  -BN        Precautions/Limitations, Vision  vision impairment, bilaterally  -BN        Precautions/Limitations, Hearing  WFL;for purposes of eval  -BN        Prior Level of Function-Communication  cognitive-linguistic impairment;other (see comments) dementia dx  -BN        Prior Level of Function-Swallowing  no diet consistency restrictions;thin liquids;regular textures  -BN        Plans/Goals Discussed with  patient;spouse/S.O.  -BN        Barriers to Rehab  cognitive status  -BN        Patient's Goals for Discharge  patient did not state  -BN        Family Goals for Discharge  patient able to return to PO diet  -BN           Pain    Additional Documentation  Pain Scale: FACES Pre/Post-Treatment (Group)  -BN           Pain Scale: FACES Pre/Post-Treatment    Pain: FACES Scale, Pretreatment  4-->hurts little more  -BN        Posttreatment Pain Rating  2-->hurts little bit  -BN        Pain Location  hip  -BN        Pre/Posttreatment Pain Comment  reposition  -BN           Oral Motor Structure and Function    Dentition Assessment  teeth are in poor condition;poor oral hygiene  -BN        Secretion Management  WNL/WFL  -BN        Mucosal Quality  moist, healthy  -BN           Oral Musculature and Cranial Nerve Assessment    Oral Motor General Assessment  generalized oral motor weakness  -BN           General Eating/Swallowing Observations    Respiratory Support Currently in Use  room air  -BN        Eating/Swallowing Skills  fed by SLP  -BN        Positioning During Eating  upright in bed  -BN        Utensils Used  spoon;straw  -BN         Consistencies Trialed  regular textures;pudding thick;honey-thick liquids;nectar/syrup-thick liquids;thin liquids  -BN           Clinical Swallow Eval    Oral Prep Phase  WFL  -BN        Oral Transit  WFL  -BN        Oral Residue  impaired  -BN        Pharyngeal Phase  suspected pharyngeal impairment  -BN        Esophageal Phase  unremarkable  -BN        Clinical Swallow Evaluation Summary   clinical bedside swallow evalution completed. Pt admitted for acute hip fracture. Hx of dementia and legally blind. Pt resides at a SNF and on regular solids diet with thin liquids at SNF as well as magic cup for weight support per SNF notes. Meds crushed in applesauce per SNF reports. Pt is legally blind and requires 1:1 feeding. Pt presented with full range of consistencies except mech soft. No overt s/s of aspiration noted with PO trials. Pt okay to resume regular solids diet with thin liquids. Meds crushed in applesauce. ST cannot fully r/o aspiration with PO. RN to continue to monitor for any increased lung congestion. ST services no longer warranted. MD to reconsult if change or new concern.   -BN           Oral Residue Concerns    Oral Residue Concerns  diffuse residue throughout oral cavity  -BN        Diffuse Residue Throughout Oral Cavity  regular consistencies  -BN           Pharyngeal Phase Concerns    Pharyngeal Phase Concerns  multiple swallows  -BN        Multiple Swallows  pudding;honey  -BN           Clinical Impression    SLP Swallowing Diagnosis  functional oral phase;functional pharyngeal phase  -BN        Functional Impact  no impact on function  -BN        Swallow Criteria for Skilled Therapeutic Interventions Met  no problems identified which require skilled intervention  -BN           Recommendations    Therapy Frequency (Swallow)  evaluation only  -BN        SLP Diet Recommendation  regular textures;thin liquids  -BN        Recommended Precautions and Strategies  upright posture during/after  eating;small bites of food and sips of liquid;1:1 supervision;general aspiration precautions  -        Oral Care Recommendations  Oral Care before breakfast, after meals and PRN  -        SLP Rec. for Method of Medication Administration  meds crushed;with pudding or applesauce  -        Monitor for Signs of Aspiration  yes;notify SLP if any concerns;cough;gurgly voice;throat clearing;pneumonia;right lower lobe infiltrates  -        Anticipated Discharge Disposition (SLP)  skilled nursing facility  -          User Key  (r) = Recorded By, (t) = Taken By, (c) = Cosigned By    Initials Name Effective Dates    Reina Mata CCC-SLP 02/11/20 -           EDUCATION  The patient has been educated in the following areas:   Dysphagia (Swallowing Impairment).    SLP Recommendation and Plan  SLP Swallowing Diagnosis: functional oral phase, functional pharyngeal phase  SLP Diet Recommendation: regular textures, thin liquids     Monitor for Signs of Aspiration: yes, notify SLP if any concerns, cough, gurgly voice, throat clearing, pneumonia, right lower lobe infiltrates     Swallow Criteria for Skilled Therapeutic Interventions Met: no problems identified which require skilled intervention  Anticipated Discharge Disposition (SLP): skilled nursing facility     Therapy Frequency (Swallow): evaluation only              Anticipated Discharge Disposition (SLP): skilled nursing facility        Reason for Discharge: other (see comments)(eval only)                  Time Calculation:   Time Calculation- SLP     Row Name 10/17/20 1500             Time Calculation- SLP    SLP Start Time  1350  -      SLP Stop Time  1500  -      SLP Time Calculation (min)  70 min  -      SLP Received On  10/17/20  -        User Key  (r) = Recorded By, (t) = Taken By, (c) = Cosigned By    Initials Name Provider Type    Reina Mata CCC-SLP Speech and Language Pathologist          Therapy Charges for Today     Code  Description Service Date Service Provider Modifiers Qty    [id number] HC ST EVAL ORAL PHARYNG SWALLOW 5 10/17/2020 Reina Le CCC-SLP GN 1               SLP Discharge Summary  Anticipated Discharge Disposition (SLP): skilled nursing facility  Reason for Discharge: other (see comments)(eval only)  Progress Toward Achieving Short/long Term Goals: other (see comments)(eval only)  Discharge Destination: other (see comments)(still in acute care)    Reina Le CCC-SLP  10/17/2020

## 2020-10-17 NOTE — PROGRESS NOTES
Discharge Planning Assessment  [facility]     Patient Name: Gabe Melendez  MRN: [id number]  Today's Date: 10/17/2020    Admit Date: 10/17/2020    Discharge Needs Assessment     Row Name 10/17/20 1210       Living Environment    Lives With  facility resident    Name(s) of Who Lives With Patient  [facility]    Current Living Arrangements  home/apartment/condo    Primary Care Provided by  -- facility cares for pt    Provides Primary Care For  no one    Family Caregiver if Needed  -- facility cares for pt    Quality of Family Relationships  helpful;involved;supportive    Able to Return to Prior Arrangements  yes       Resource/Environmental Concerns    Resource/Environmental Concerns  none    Transportation Concerns  car, none       Transition Planning    Patient/Family Anticipates Transition to  inpatient rehabilitation facility    Patient/Family Anticipated Services at Transition  none    Transportation Anticipated  family or friend will provide       Discharge Needs Assessment    Readmission Within the Last 30 Days  no previous admission in last 30 days    Equipment Currently Used at Home  -- facility provides all DME needed    Concerns to be Addressed  no discharge needs identified;denies needs/concerns at this time    Anticipated Changes Related to Illness  none    Equipment Needed After Discharge  none    Discharge Coordination/Progress  Pt has RX coverage and a PCP. Pt is a resident of [facility]. SE spoke with mayco Man, who states that pt does have a bedhold. No needs identified at this time. SW will follow and assist with any discharge needs that may arise.        Discharge Plan    No documentation.       Continued Care and Services - Admitted Since 10/17/2020    Coordination has not been started for this encounter.         Demographic Summary    No documentation.       Functional Status    No documentation.       Psychosocial    No documentation.       Abuse/Neglect    No  documentation.       Legal    No documentation.       Substance Abuse    No documentation.       Patient Forms    No documentation.           Brooklynn Acuña

## 2020-10-17 NOTE — PLAN OF CARE
Goal Outcome Evaluation:  Plan of Care Reviewed With: patient  Progress: no change  Outcome Summary: Oriented to self. C/o pain. Prn pain medication given with some relief. Denies n/t. PPP. . Room air. Tele- NS 90. Can be tachycardic at times. SCD. Bedrest. Head/face lacerations cleaned with normal saline, images uploaded. NPO at midnight for possible sx tomorrow. Call light within reach. Will continue to monitor.

## 2020-10-17 NOTE — PLAN OF CARE
ST clinical bedside swallow evalution completed. Pt admitted for acute hip fracture. Hx of dementia and legally blind. Pt resides at a SNF and on regular solids diet with thin liquids at SNF as well as magic cup for weight support per SNF notes. Meds crushed in applesauce per SNF reports. Pt is legally blind and requires 1:1 feeding. Pt presented with full range of consistencies except mech soft. No overt s/s of aspiration noted with PO trials. Pt okay to resume regular solids diet with thin liquids. Meds crushed in applesauce. ST cannot fully r/o aspiration with PO. RN to continue to monitor for any increased lung congestion. ST services no longer warranted. MD to reconsult if change or new concern.

## 2020-10-17 NOTE — H&P
"Patient ID: Gabe Melendez  MRN: [id number]     Acct:  [id number]  Admit Date: 10/17/2020   Date of service: 10/17/2020    SOURCE: The source of this information is prior knowledge of the patient, review of his office records, his current chart, as well as discussion with his wife and [facility] ER personal; records are limited but those spoke with are considered reliable.      PATIENT PROFILE: The patient is a 87 y/o white  male resident of [facility] and nursing (just recently moving here from [facility] that closed); he was cooperative.      CHIEF COMPLAINT: \"he broke his hip\"     HISTORY OF PRESENT ILLNESS: This gentleman has a dementia.  The exact type is not clarified.  He is also legally blind.  He occasionally is mildly agitated; primarily this means standing and trying to walk without assistance.  He fell and struck his head at his nursing home the morning of admission and was found to have a laceration of the posterior scalp.  He was sent to [facility] where they stapled that and performed a CT of his head that showed nothing acute.  He had no neck pain.  He was however found to have a left hip fracture.  I arranged transfer to this facility and have talked to Dr. Hernandez for orthopedic consultation.  The wife is been made aware.  She says he is otherwise well; never having had cancer heart or lung disease.    Lab Results:  Lab Results (last 24 hours)     Procedure Component Value Units Date/Time    COVID-19,[facility] IN-HOUSE,Nasal Swab No Transport Media 3-4 HR TAT - Swab, Nasal Cavity [[id number]] Collected: 10/17/20 1133    Specimen: Swab from Nasal Cavity Updated: 10/17/20 1136        Allergies   Allergen Reactions   • Tetracyclines & Related Nausea Only     Unknown?       HOME MEDICATIONS:  Prior to Admission medications    Medication Sig Start Date End Date Taking? Authorizing Provider   acetaminophen (TYLENOL) 325 MG tablet Take 650 mg by mouth Every 4 (Four) Hours As Needed " for Fever or Pain. Not to exceed 3 gm in 24 hr    Anthony Connolly MD   aluminum-magnesium hydroxide-simethicone (MAALOX/MYLANTA) 200-200-20 MG/5ML suspension Take 30 mL by mouth Every 4 (Four) Hours As Needed for Indigestion.    Anthony Connolly MD   bisacodyl (DULCOLAX) 10 MG suppository Insert 10 mg into the rectum Daily As Needed for Constipation. If no bm from MOM    Anthony Connolly MD   divalproex (DEPAKOTE) 125 MG DR tablet Take 125 mg by mouth Every Morning.    Anthony Connolly MD   divalproex (DEPAKOTE) 125 MG DR tablet Take 2 tablets by mouth Every Night.    Anthony Connolly MD   donepezil (ARICEPT) 10 MG tablet Take 10 mg by mouth Every Night.    Anthony Connolly MD   ketoconazole (NIZORAL) 2 % shampoo Apply  topically to the appropriate area as directed Every 12 (Twelve) Hours As Needed for Dry Skin. To scalp for dry scalp    Anthony Connolly MD   magnesium hydroxide (MILK OF MAGNESIA) 400 MG/5ML suspension Take 30 mL by mouth Daily As Needed for Constipation. If no bm in 3 days    Anthony Connolly MD   potassium chloride (MICRO-K) 10 MEQ CR capsule Take 2 capsules by mouth Daily.    Anthony Connolly MD   Prenatal Vit-Fe Fumarate-FA (M-VIT PO) Take 1 tablet by mouth Daily.    Anthony Connolly MD   travoprost, BAK free, (TRAVATAN) 0.004 % solution ophthalmic solution Administer 1 drop to both eyes Every Evening.    Anthony Connolly MD       PAST HISTORY:  CHILDHOOD: unremarkable.     PROCEDURES:  Prostate exam/    SURGERIES:  None on record    FAMILY HISTORY:  HTN/  Heart/  DM/  CA-colon/  CA-prostate/  CA-breast/  CA-other/    HABITS:  Tobacco-smoker/  Tobacco-2nd handed/  Alcohol/  Drugs/    SOCIAL HISTORY:  /+  /  /  Employment/  Retired/  Disability/+  Children/    HOSPITAL ADMITS:   :   None in [facility]:   none    Margie:   No care everwhere noted    Review of Systems  Unable; unable to answer questions  with understanding or reliability    PHYSICAL EXAMINATION:  /80 (BP Location: Right arm, Patient Position: Lying)   Pulse 118   Temp 98 °F (36.7 °C) (Temporal)   Resp 16   SpO2 93%     Physical Exam  GENERAL:  Well nourished/developed in no acute distress. Thin  SKIN: Turgor excellent, without wound, rash, lesion.  HEENT: Normal cephalic without trauma.  Pupils equal round reactive to light. Extraocular motions full without nystagmus.     External canals nonobstructive nontender without reddness.  Oral cavity without growths, exudates, and moist.  Posterior pharynx without mass, obstruction, redness.  No thyromegaly, mass, tenderness, lymphadenopathy and supple.  CV: Regular rhythm.  No murmur, gallop,  edema. Posterior pulses intact.  No carotid bruits.   CHEST: No chest wall tenderness or mass.   LUNGS: Symmetric motion with clear to auscultation.    ABD: Soft, nontender without mass.   ORTHO: Symmetric extremities without swelling/point tenderness except L hip; LLE externally rotated.  Full gross range of motion except L hip  NEURO: LLE deferred: CN 2-12 grossly intact.  Symmetric facies. 1/4 x bicep equal reflexes.  UE/LE   2/5 strength throughout.  Nonfocal use extremities. Speech clear/mumbles   PSYCH: Disoriented x 3.  Pleasant calm, well kept.  Non-purposeful mumbling     ASSESSMENT/PROBLEM LIST:   85 y/o white male-advanced age   Allergy/intolerance: see above  Procedural history: to develop  Family history: to develop  Dementia  Agitation-depokate treated  glaucoma  Blindness  Gait difficulty-chronic  constipation  Nursing home resident    REASON FOR ADMISSION:    L hip fracture  L hip pain  Acute gait decline  Perioperative status  Incomplete data base    PLANS:   Rx-reviewed; ordered as needed and will review daily/as needed.   Includes prevention for DVT prevention as safe.   LAB-reviewed; ordered as needed and will review daily.  Particular:  COVID testing; Daily CBC,  chemistry  Imaging-reviewed; ordered as needed and will review daily.  Particular:  Repeat L hip film  Consults ortho  Diet: speech to eval; NPO when needed for surgery  Fluids: IV + oral as able  Special issues: echocardiogram; to better access cardiac status  DC planning: assume back to [facility]  Code status: FULL until better defined by wife in route

## 2020-10-18 NOTE — BRIEF OP NOTE
HIP BIPOLAR ANTERIOR  Progress Note    Gabe Melendez  10/18/2020    Pre-op Diagnosis:   Left displaced femoral neck fracture (CMS/[facility]) [S72.002A]       Post-Op Diagnosis Codes:     * Left displaced femoral neck fracture (CMS/HCC) [S72.002A]    Procedure/CPT® Codes:    Procedure(s):  HIP BIPOLAR ANTERIOR - LEFT    Surgeon(s):  Juwan Hernandez MD    Anesthesia: General with Block    Staff:   Circulator: Kenji Valente, RN, BSN; Marie Chatman RN  Scrub Person: Bernice Parr; Alyce Alvarez; Aaron Henderson    Estimated Blood Loss: 100ml    Urine Voided: * No values recorded between 10/18/2020  9:33 AM and 10/18/2020 10:50 AM *    Specimens:                None    Drains: * No LDAs found *    Findings: Displaced left femoral neck fracture    Complications: None    Juwan Hernandez MD     Date: 10/18/2020  Time: 10:50 CDT

## 2020-10-18 NOTE — PROGRESS NOTES
Patient ID: Gabe Melendez  MRN: [id number]     Acct:  [id number]  Admit Date: 10/17/2020   Date of service: 10/18/2020     LOS: 1 day   Patient Care Team:  Ba Shetty MD as PCP - General (Family Medicine)    Chief Complaint:  He broke his hip    Subjective  HISTORY OF PRESENT ILLNESS: This gentleman has a dementia.  The exact type is not clarified.  He is also legally blind.  He occasionally is mildly agitated; primarily this means standing and trying to walk without assistance.  He fell and struck his head at his nursing home the morning of admission and was found to have a laceration of the posterior scalp.  He was sent to [facility] where they stapled that and performed a CT of his head that showed nothing acute.  He had no neck pain.  He was however found to have a left hip fracture.  I arranged transfer to this facility and have talked to Dr. Hernandez for orthopedic consultation.  The wife is been made aware.  She says he is otherwise well; never having had cancer heart or lung disease.    Interval History:  Kept confortable.  Usual confused.  Echo was good; repeat labs ok.  IV fluids to prevent dehydration.     .  Current Facility-Administered Medications:   •  [MAR Hold] acetaminophen (TYLENOL) tablet 650 mg, 650 mg, Oral, Q4H PRN, Ba Shetty MD  •  divalproex (DEPAKOTE) DR tablet 125 mg, 125 mg, Oral, QAM, Ba Shetty MD, 125 mg at 10/17/20 1442  •  divalproex (DEPAKOTE) DR tablet 250 mg, 250 mg, Oral, Nightly, Ba Shetty MD, 250 mg at 10/17/20 2146  •  [MAR Hold] HYDROcodone-acetaminophen (NORCO) 5-325 MG per tablet 1 tablet, 1 tablet, Oral, Q4H PRN, Ba Shetty MD, 1 tablet at 10/17/20 1320  •  [MAR Hold] latanoprost (XALATAN) 0.005 % ophthalmic solution 1 drop, 1 drop, Both Eyes, Nightly, Ba Shetty MD, 1 drop at 10/17/20 2147  •  [MAR Hold] Morphine sulfate (PF) injection 1 mg, 1 mg, Intravenous, Q4H PRN, 1 mg at 10/17/20 7627 **AND** [MAR  "Hold] naloxone (NARCAN) injection 0.4 mg, 0.4 mg, Intravenous, Q5 Min PRN, Ba Shetty MD  •  [MAR Hold] sodium chloride 0.9 % flush 10 mL, 10 mL, Intravenous, Q12H, Ba Shetty MD, 10 mL at 10/17/20 2147  •  [MAR Hold] sodium chloride 0.9 % flush 10 mL, 10 mL, Intravenous, PRN, Ba Shetty MD  •  sodium chloride 0.9 % infusion, 100 mL/hr, Intravenous, Continuous, Ba Shetty MD, Last Rate: 100 mL/hr at 10/18/20 0600, 100 mL/hr at 10/18/20 0600    Review of Systems:   Unable; too confused to be reliable.   Updated PH with wife who was here    Objective     Vital Signs  /64   Pulse 116   Temp 98.6 °F (37 °C) (Temporal)   Resp 15   Ht 172.7 cm (68\")   Wt 69.9 kg (154 lb)   SpO2 97%   BMI 23.42 kg/m²   Temp:  [97.7 °F (36.5 °C)-98.6 °F (37 °C)] 98.6 °F (37 °C)  Heart Rate:  [] 116  Resp:  [15-18] 15  BP: (124-205)/() 162/64      Intake/Output Summary (Last 24 hours) at 10/18/2020 1150  Last data filed at 10/18/2020 1030  Gross per 24 hour   Intake 1000 ml   Output --   Net 1000 ml       Lab Results:  Lab Results (last 24 hours)     Procedure Component Value Units Date/Time    Basic Metabolic Panel [[id number]]  (Abnormal) Collected: 10/18/20 0434    Specimen: Blood Updated: 10/18/20 0607     Glucose 120 mg/dL      BUN 15 mg/dL      Creatinine 0.95 mg/dL      Sodium 137 mmol/L      Potassium 4.2 mmol/L      Comment: Slight hemolysis detected by analyzer. Results may be affected.        Chloride 106 mmol/L      CO2 23.0 mmol/L      Calcium 9.4 mg/dL      eGFR Non African Amer 75 mL/min/1.73      BUN/Creatinine Ratio 15.8     Anion Gap 8.0 mmol/L     Narrative:      GFR Normal >60  Chronic Kidney Disease <60  Kidney Failure <15      CBC & Differential [[id number]]  (Abnormal) Collected: 10/18/20 0434    Specimen: Blood Updated: 10/18/20 0539    Narrative:      The following orders were created for panel order CBC & Differential.  Procedure                      "          Abnormality         Status                     ---------                               -----------         ------                     CBC Auto Differential[[id number]]        Abnormal            Final result                 Please view results for these tests on the individual orders.    CBC Auto Differential [[id number]]  (Abnormal) Collected: 10/18/20 0434    Specimen: Blood Updated: 10/18/20 0539     WBC 13.86 10*3/mm3      RBC 4.31 10*6/mm3      Hemoglobin 13.8 g/dL      Hematocrit 41.7 %      MCV 96.8 fL      MCH 32.0 pg      MCHC 33.1 g/dL      RDW 13.6 %      RDW-SD 48.2 fl      MPV 11.2 fL      Platelets 189 10*3/mm3      Neutrophil % 81.7 %      Lymphocyte % 7.6 %      Monocyte % 9.9 %      Eosinophil % 0.2 %      Basophil % 0.2 %      Immature Grans % 0.4 %      Neutrophils, Absolute 11.31 10*3/mm3      Lymphocytes, Absolute 1.06 10*3/mm3      Monocytes, Absolute 1.37 10*3/mm3      Eosinophils, Absolute 0.03 10*3/mm3      Basophils, Absolute 0.03 10*3/mm3      Immature Grans, Absolute 0.06 10*3/mm3      nRBC 0.0 /100 WBC     Urinalysis With Microscopic If Indicated (No Culture) - Urine, Clean Catch [[id number]]  (Abnormal) Collected: 10/17/20 1601    Specimen: Urine, Clean Catch Updated: 10/17/20 1612     Color, UA Yellow     Appearance, UA Clear     pH, UA 6.0     Specific Gravity, UA 1.019     Glucose, UA Negative     Ketones, UA 15 mg/dL (1+)     Bilirubin, UA Negative     Blood, UA Negative     Protein, UA Trace     Leuk Esterase, UA Negative     Nitrite, UA Negative     Urobilinogen, UA 1.0 E.U./dL    Narrative:      Urine microscopic not indicated.    TSH [[id number]]  (Normal) Collected: 10/17/20 1149    Specimen: Blood Updated: 10/17/20 1325     TSH 2.510 uIU/mL     Comprehensive Metabolic Panel [[id number]]  (Abnormal) Collected: 10/17/20 1149    Specimen: Blood Updated: 10/17/20 1322     Glucose 123 mg/dL      BUN 18 mg/dL      Creatinine 0.84 mg/dL      Sodium 134 mmol/L      Potassium 4.1  mmol/L      Comment: Slight hemolysis detected by analyzer. Results may be affected.        Chloride 104 mmol/L      CO2 19.0 mmol/L      Calcium 9.4 mg/dL      Total Protein 7.0 g/dL      Albumin 4.00 g/dL      ALT (SGPT) 19 U/L      AST (SGOT) 28 U/L      Alkaline Phosphatase 82 U/L      Total Bilirubin 0.5 mg/dL      eGFR Non African Amer 87 mL/min/1.73      Globulin 3.0 gm/dL      A/G Ratio 1.3 g/dL      BUN/Creatinine Ratio 21.4     Anion Gap 11.0 mmol/L     Narrative:      GFR Normal >60  Chronic Kidney Disease <60  Kidney Failure <15      BNP [[id number]]  (Normal) Collected: 10/17/20 1149    Specimen: Blood Updated: 10/17/20 1322     proBNP 436.3 pg/mL     Narrative:      Among patients with dyspnea, NT-proBNP is highly sensitive for the detection of acute congestive heart failure. In addition NT-proBNP of <300 pg/ml effectively rules out acute congestive heart failure with 99% negative predictive value.    Results may be falsely decreased if patient taking Biotin.      CBC Auto Differential [[id number]]  (Abnormal) Collected: 10/17/20 1149    Specimen: Blood Updated: 10/17/20 1255     WBC 16.32 10*3/mm3      RBC 4.55 10*6/mm3      Hemoglobin 14.6 g/dL      Hematocrit 43.9 %      MCV 96.5 fL      MCH 32.1 pg      MCHC 33.3 g/dL      RDW 13.2 %      RDW-SD 47.4 fl      MPV 12.0 fL      Platelets 167 10*3/mm3      Neutrophil % 84.9 %      Lymphocyte % 5.6 %      Monocyte % 8.3 %      Eosinophil % 0.1 %      Basophil % 0.2 %      Immature Grans % 0.9 %      Neutrophils, Absolute 13.85 10*3/mm3      Lymphocytes, Absolute 0.91 10*3/mm3      Monocytes, Absolute 1.36 10*3/mm3      Eosinophils, Absolute 0.01 10*3/mm3      Basophils, Absolute 0.04 10*3/mm3      Immature Grans, Absolute 0.15 10*3/mm3      nRBC 0.0 /100 WBC     COVID-19,[facility] IN-HOUSE,Nasal Swab No Transport Media 3-4 HR TAT - Swab, Nasal Cavity [[id number]]  (Normal) Collected: 10/17/20 1133    Specimen: Swab from Nasal Cavity Updated: 10/17/20 1241      COVID19 Not Detected    Narrative:      Fact sheet for providers: https://www.fda.gov/media/022010/download     Fact sheet for patients: https://www.fda.gov/media/639059/download    Blood Gas, Arterial [[id number]]  (Abnormal) Collected: 10/17/20 1220    Specimen: Arterial Blood Updated: 10/17/20 1230     Site Left Brachial     Edu's Test N/A     pH, Arterial 7.459 pH units      Comment: 83 Value above reference range        pCO2, Arterial 31.0 mm Hg      Comment: 84 Value below reference range        pO2, Arterial 85.8 mm Hg      HCO3, Arterial 22.0 mmol/L      Base Excess, Arterial -0.9 mmol/L      Comment: 84 Value below reference range        O2 Saturation, Arterial 98.0 %      Temperature 37.0 C      Barometric Pressure for Blood Gas 757 mmHg      Modality Room Air     FIO2 21 %      Ventilator Mode NA     Collected by [id number]     Comment: Meter: [id number]     :  [id number]        pCO2, Temperature Corrected 31.0 mm Hg      pH, Temp Corrected 7.459 pH Units      pO2, Temperature Corrected 85.8 mm Hg           CBC:   Results from last 7 days   Lab Units 10/18/20  0434 10/17/20  1149   WBC 10*3/mm3 13.86* 16.32*   HEMOGLOBIN g/dL 13.8 14.6   HEMATOCRIT % 41.7 43.9   PLATELETS 10*3/mm3 189 167     BMP:   Results from last 7 days   Lab Units 10/18/20  0434 10/17/20  1149   SODIUM mmol/L 137 134*   POTASSIUM mmol/L 4.2 4.1   CHLORIDE mmol/L 106 104   CO2 mmol/L 23.0 19.0*   BUN mg/dL 15 18   CREATININE mg/dL 0.95 0.84   EGFR IF NONAFRICN AM mL/min/1.73 75 87   GLUCOSE mg/dL 120* 123*   CALCIUM mg/dL 9.4 9.4   ALT (SGPT) U/L  --  19       Culture Results: None    Radiology:   Xr Chest 1 View    Result Date: 10/17/2020  EXAMINATION: XR CHEST 1 VW-. 10/17/2020 6:59 PM CDT  CHEST, ONE VIEW:  HISTORY: Trauma, femoral neck fracture. Dementia  COMPARISON: None  A single frontal chest radiograph was obtained.  FINDINGS:  The lungs are clear without infiltrates.  The heart is normal in size, pulmonary  circulation appropriate, without heart failure.  The bony structures are intact without acute osseous abnormality.                                  1. No acute cardiopulmonary process.  This report was finalized on 10/17/2020 19:00 by Dr. Aditya Parker MD.    Xr Hip With Or Without Pelvis 2 - 3 View Left    Result Date: 10/17/2020  EXAMINATION: XR HIP W OR WO PELVIS 2-3 VIEW LEFT- 10/17/2020 2:58 PM CDT  HISTORY: Left hip fracture, pain.  REPORT: An AP view of the pelvis, 2 separate views of the left hip were obtained.  COMPARISON: There are no correlative imaging studies for comparison.  The right hip appears normal. There is an impacted complete closed transverse fracture through the left femoral neck with a 2 cm the superior migration of the femur, the femoral head remains in normal position. There is mild lateral angulation. The acetabulum appears normal.      Acute complete impacted transverse fracture through the left femoral neck with mild superior migration of the femur and mild lateral angulation. This report was finalized on 10/17/2020 14:59 by Dr. Piyush Smiley MD.      Additional Studies Reviewed:     Results for orders placed during the hospital encounter of 10/17/20   Adult Transthoracic Echo Complete W/ Cont if Necessary Per Protocol    Narrative · Left ventricular systolic function is normal. Left ventricular ejection   fraction appears to be 56 - 60%.  · Left ventricular diastolic function is consistent with (grade I)   impaired relaxation.  · Normal right ventricular cavity size and systolic function noted.  · No hemodynamically significant valvular abnormalities identified on this   study.          Physical Exam:  GENERAL:  Well nourished/developed in no acute distress. Thin  SKIN: Turgor excellent, without wound, rash, lesion.  HEENT: Normal cephalic without trauma.  Pupils equal round reactive to light. Extraocular motions full without nystagmus.     External canals nonobstructive nontender  without reddness.  Oral cavity without growths, exudates, and moist.  Posterior pharynx without mass, obstruction, redness.  No thyromegaly, mass, tenderness, lymphadenopathy and supple.  CV: Regular rhythm.  No murmur, gallop,  edema. Posterior pulses intact.  No carotid bruits.   CHEST: No chest wall tenderness or mass.   LUNGS: Symmetric motion with clear to auscultation.    ABD: Soft, nontender without mass.   ORTHO: Symmetric extremities without swelling/point tenderness except L hip; LLE externally rotated.  Full gross range of motion except L hip  NEURO: LLE deferred: CN 2-12 grossly intact.  Symmetric facies. 1/4 x bicep equal reflexes.  UE/LE   2/5 strength throughout.  Nonfocal use extremities. Speech clear/mumbles   PSYCH: Disoriented x 3.  Pleasant calm, well kept.  Non-purposeful mumbling     Results Review:  above    ASSESSMENT/PLAN:  Reason for admit/problems addressing acutely:  L hip fracture  L hip pain  Acute gait decline  Perioperative status  Incomplete data base  Head contusion  Scalp laceration  Anticoagulation needs; some risk with head contusion    Chronic problems to review/consider during care:  85 y/o white male-advanced age   Allergy/intolerance: see above  Procedural history: to develop  Family history: to develop  Dementia  Agitation-depokate treated  glaucoma  Blindness  History back surgery  Gait difficulty-chronic  constipation  Nursing home resident  Osteoporosis-hip fracture    Rx-reviewed, considered with changes as needed: same; advance as needed/able  Labs reviewed, considered and changes made as needed: daily chemistry/CBC  Imaging reviewed, and need for considered: if more; per others  Consults  Current:   Consults     Date and Time Order Name Status Description    10/17/2020 1141 Inpatient Orthopedic Surgery Consult Completed         Signed off: none  Add: none  Diet reviewed, considered and changes made as needed: NPO  Fluids reviewed, considered and changes made as needed:  IV support  Increase activity: if/as able  Code status:   Code Status and Medical Interventions:   Ordered at: 10/17/20 6911     Limited Support to NOT Include:    Artificial Nutrition    Intubation    Antiarrhythmic Drugs    Antibiotics    Blood Products    NIPPV (Non-Invasive Positive Pressure Support)    Vasopressors    Cardioversion/Defibrillation    Dialysis     Code Status:    No CPR     Medical Interventions (Level of Support Prior to Arrest):    Limited     Discussed possible/future discharge plans: wife ok back to [facility]  Case discussed with wife, nursing.  Available to talk if needed with  POA/caregiver and members care team.    Ba Shetty MD  10/18/20  11:50 CDT

## 2020-10-18 NOTE — ANESTHESIA PROCEDURE NOTES
Airway  Urgency: elective    Date/Time: 10/18/2020 9:36 AM  Airway not difficult    General Information and Staff    Patient location during procedure: OR  CRNA: Cortney Tucker CRNA    Indications and Patient Condition  Indications for airway management: airway protection    Preoxygenated: yes  Mask difficulty assessment: 1 - vent by mask    Final Airway Details  Final airway type: endotracheal airway      Successful airway: ETT  Cuffed: yes   Successful intubation technique: video laryngoscopy  Facilitating devices/methods: intubating stylet  Endotracheal tube insertion site: oral  Blade: Jose  Blade size: 3  ETT size (mm): 7.5  Cormack-Lehane Classification: grade I - full view of glottis  Placement verified by: chest auscultation and capnometry   Cuff volume (mL): 8  Measured from: lips  ETT/EBT  to lips (cm): 22  Number of attempts at approach: 2  Assessment: lips, teeth, and gum same as pre-op and atraumatic intubation    Additional Comments  1st attempt: Mil 2, unable to manipulate blade with overbite and poor dentition.  Opted to switch to [place]

## 2020-10-18 NOTE — ANESTHESIA PREPROCEDURE EVALUATION
Anesthesia Evaluation     Patient summary reviewed and Nursing notes reviewed   no history of anesthetic complications:  NPO Solid Status: > 8 hours  NPO Liquid Status: > 8 hours           Airway   Mallampati: II  TM distance: >3 FB  Neck ROM: full  Comment: overbite  Dental    (+) poor dentition    Pulmonary - negative pulmonary ROS   Cardiovascular - negative cardio ROS  Exercise tolerance: good (4-7 METS)    ECG reviewed      ROS comment: Echo reviewed -- no significant findings    Neuro/Psych  (+) dementia,       ROS Comment: blind  GI/Hepatic/Renal/Endo - negative ROS     Musculoskeletal (-) negative ROS    Abdominal    Substance History - negative use     OB/GYN negative ob/gyn ROS         Other                      Anesthesia Plan    ASA 3 - emergent     general with block   (Plan to do block under GA following completion of case)  intravenous induction     Anesthetic plan, all risks, benefits, and alternatives have been provided, discussed and informed consent has been obtained with: patient.

## 2020-10-18 NOTE — PLAN OF CARE
Goal Outcome Evaluation:  Plan of Care Reviewed With: patient  Progress: no change  Outcome Summary: Oriented to self. Pt has been resting comfortably since return from sx. Denies need for pain medication. Leigh MARIE. PPP. KEARNS. . Room air. Tele- NS 88, can be tachycardic at times. SCD. Turning when pt allows. Pt can be agitated/uncooperative at times. Safety maintained. Call light within reach. Will continue to monitor. Attempted to get pt to sit in chair, refusing at this time.

## 2020-10-18 NOTE — PLAN OF CARE
Goal Outcome Evaluation:  Plan of Care Reviewed With: patient  Progress: no change  Outcome Summary: Patient c/o pain after moving for chg bath and bed change.  Morphine seems to help decrease patient left hip pain.  To have hip surgery this am.  Patient's daughter called last night to ask if we could cut patient's toenails.  She was very adament that they were too long and patient may have trouble walking due to his long toenails.  Patient has several thick, raised toenails, advised daughter that surgeon could consult podiotry if he felt this needed address, but nursing staff could not cut on these toenails as it would be an infection risk.  Patient is blind, confused. Alert to self.  Can tell you if he's in pain, but unable to give it a number.  steristrips x 3 to forehead lac, top of head lac open to air.   NPO since MN.  Continue to monitor. Turned q2 when patient allowed it. Restarted IV fluids NS at 100 ml/hr.  Hip surgery to be 0930 this am.

## 2020-10-18 NOTE — ANESTHESIA POSTPROCEDURE EVALUATION
"Patient: Gabe Melendez    Procedure Summary     Date: 10/18/20 Room / Location: [facility] OR  /  [facility] OR    Anesthesia Start: 0926 Anesthesia Stop: 1103    Procedure: HIP BIPOLAR ANTERIOR (Left Hip) Diagnosis:       Left displaced femoral neck fracture (CMS/HCC)      (Left displaced femoral neck fracture (CMS/HCC) [S72.002A])    Surgeon: Juwan Hernandez MD Provider: Cortney Tucker CRNA    Anesthesia Type: general with block ASA Status: 3 - Emergent          Anesthesia Type: general with block    Vitals  Vitals Value Taken Time   /119 10/18/20 1129   Temp 98.6 °F (37 °C) 10/18/20 1103   Pulse 122 10/18/20 1131   Resp 15 10/18/20 1118   SpO2 92 % 10/18/20 1130   Vitals shown include unvalidated device data.        Post Anesthesia Care and Evaluation    Patient location during evaluation: PACU  Patient participation: complete - patient participated  Level of consciousness: awake and alert  Pain management: adequate  Airway patency: patent  Anesthetic complications: No anesthetic complications    Cardiovascular status: acceptable  Respiratory status: acceptable  Hydration status: acceptable    Comments: Blood pressure 162/64, pulse 116, temperature 98.6 °F (37 °C), temperature source Temporal, resp. rate 15, height 172.7 cm (68\"), weight 69.9 kg (154 lb), SpO2 97 %.    Pt discharged from PACU based on cole score >8      "

## 2020-10-18 NOTE — OP NOTE
OPERATIVE NOTE  Operative Report    Pt Name: Gabe Melendez  MRN: [id number]  YOB: 1934  Date: 10/18/2020      PREOPERATIVE DIAGNOSIS: Left displaced subcapital femoral neck fracture     POSTOPERATIVE DIAGNOSIS:  SAME    PROCEDURE:  Procedure(s):  HIP BIPOLAR ANTERIOR     SURGEON:  Juwan Hernandez MD    ASSISTANT:  None    ANESTHESIA:  General with Block    ESTIMATED BLOOD LOSS:  100 mL    COMPLICATIONS:  None.    CONDITION:  Stable.    IMPLANTS:    Implant Name Type Inv. Item Serial No.  Lot No. LRB No. Used Action   SEAL HEMO ABS ERIC AH PWDR 3GM - [id number] Implant SEAL HEMO ABS ERIC AH PWDR 3GM  [employer]  Left 1 Implanted   HD FEM MOD TARUN UNIPOL 52MM - [id number] Implant HD FEM MOD TARUN UNIPOL 52MM  DEPUY [id number] Left 1 Implanted   SPACR MODULAR TARUN TPR 3MM - [id number] Implant SPACR MODULAR TARUN TPR 3MM  DEPUY [id number] Left 1 Implanted   STEM FEM CORAIL CMTLS W/COL AMT SZ12 - [id number] Implant STEM FEM CORAIL CMTLS W/COL AMT SZ12  DEPUY [id number] Left 1 Implanted       IMPLANTS: DePuy Corail System with the following components:               52 mm diameter -3 mm monopolar femoral head              Size 12 cementless femoral press-fit stem      OPERATIVE INDICATIONS:  86 y.o. male with acute onset of left hip pain due to a displaced left femoral neck fracture.  Due to location and degree of displacement as well as with the intention of restoring mobility as soon as possible, it was ultimately elected to move forward with the above procedure.  Risk include, but are not limited to, bleeding, infection, pain, damage to neurovascular structures, leg length inequality, hip dislocation, blood clots, intraoperative death. Risks, benefits, and alternatives were discussed and the patient wished to proceed.      PROCEDURE IN DETAIL:  After informed consent, the patient was given 3 gm of Ancef, then Tranxene acid was administered and, the patient  successfully underwent anesthetic induction.  Singleton catheter was inserted.  He was placed on the Jerome table.  The left hip was then prepped and draped in the usual sterile fashion.        A 10 cm incision starting lateral to the ASIS extending it distally just anterior to the greater trochanter.  The TFL fascia was then incised.  The TFL was taken laterally, while the sartorius and rectus were taken medially.  The ascending branch of the lateral femoral circumflex vessels were identified and cauterized.  An anterior capsulotomy was then performed.  The neck resection was made at the equator of the femoral head and in the saddle of the neck.  This effectively created a wafer osteotomy with the femoral neck fracture to facilitate extraction of the femoral head.  Corkscrew extractor was advanced into the retained femoral head and the femoral head was disarticulated from the acetabulum.  Next the acetabulum was exposed.  The femoral head was sized and the back table noted to be 52 mm in diameter.     At this point, the leg was externally rotated and extended.  Continued capsular excision was performed to help with exposure.  Under fluoroscopic guidance the canal was entered with a canal finder.  Sequential broaching was then performed up to a size 12 broach followed by the use of a calcar planer.  A trial reduction was performed.  C-arm fluoroscopy images were then taken of the pelvis in the AP trajectory to include visualization of the bilateral lesser trochanters to ensure appropriate restoration of leg length.  Finally, a final size 12 CORAIL stem was press-fit down the proximal femoral canal.  A final 52 mm -3 mm monopolar head was placed on the Conley taper.  It was impacted into place and noted to be firmly fixated.  The hip was reduced with excellent stability.  Anterior maneuvers including hyperextension with external rotation and abduction were performed with no evidence of anterior instability.  Fluoroscopic  views revealed excellent alignment of the femoral aspect of components.  The wound was then copiously irrigated with 2 L of irrigation.  TFL fascia was then closed with an 0 Vicryl suture.  2-0 Vicryl suture was then used for the subcutaneous closure of the tissues.  A final 3-0 Monocryl and a running subcuticular fashion was placed at each of the incision site.  Finally, a prenio Dermabond adhesive skin dressing was applied to the incisions without tension.  A Mepilex dressing was applied thereafter to both sites, as well.      The patient was awakened by anesthesia transported to the PACU in stable condition.       POSTOPERATIVE PLAN: Admit inpatient for monitoring, PT/OT, 6 weeks of DVT prophylaxis, and IV antibiotics for 24 hrs.  Weight bear as tolerated with anterior hip precautions.

## 2020-10-18 NOTE — ANESTHESIA PROCEDURE NOTES
Peripheral Block    Pre-sedation assessment completed: 10/18/2020 10:50 AM    Patient reassessed immediately prior to procedure    Patient location during procedure: OR  Start time: 10/18/2020 10:52 AM  Stop time: 10/18/2020 10:54 AM  Reason for block: procedure for pain, at surgeon's request, post-op pain management and Requested by   Performed by  CRNA: Cortney Tucker CRNA  Preanesthetic Checklist  Completed: patient identified, site marked, surgical consent, pre-op evaluation, timeout performed, IV checked, risks and benefits discussed and monitors and equipment checked  Prep:  Pt Position: supine  Sterile barriers:gloves  Prep: ChloraPrep  Patient monitoring: blood pressure monitoring, continuous pulse oximetry and EKG  Procedure  Sedation:yes  Performed under: general  Guidance:ultrasound guided and fascial plane identified and local anesthetic infiltrated in iliaca compartment  ULTRASOUND INTERPRETATION. Using ultrasound guidance a 20 G gauge needle was placed in close proximity to the nerve, at which point, under ultrasound guidance anesthetic was injected in the area of the nerve and spread of the anesthesia was seen on ultrasound in close proximity thereto.  There were no abnormalities seen on ultrasound; a digital image was taken; and the patient tolerated the procedure with no complications. Images:still images obtained (picture printed and placed in patients chart)    Block Type:fascia iliaca compartment  Injection Technique:single-shot  Needle Type:echogenic  Needle Gauge:20 G      Medications Used: ropivacaine (NAROPIN) 0.2% injection, 40 mL  Med admintered at 10/18/2020 10:54 AM      Post Assessment  Injection Assessment: negative aspiration for heme, no paresthesia on injection and incremental injection  Patient Tolerance:comfortable throughout block  Complications:no

## 2020-10-18 NOTE — CONSULTS
Orthopaedic Inpatient Consultation    NAME:  Gabe Melendez   : 1934  MRN: [id number]    10/17/2020 11:23 AM    Requesting Physician: Ba Shetty MD    CHIEF COMPLAINT:  left hip pain    HISTORY OF PRESENT ILLNESS:   The patient is a 86 y.o. male patient in a nursing facility who experienced a fall onto his left side resulting in the inability to bear weight.  Pain is located in the left hip and groin, rated a 2/5, dull and constant, worse with movement, better with rest and medication.  There are no associated symptoms.      Past Medical History:    Past Medical History:   Diagnosis Date   • Arthropathy, unspecified    • Blind in both eyes    • Dementia (CMS/[facility])    • Glaucoma    • Testicular hypofunction        Past Surgical History:    History reviewed. No pertinent surgical history.    Current Medications:   Prior to Admission medications    Medication Sig Start Date End Date Taking? Authorizing Provider   acetaminophen (TYLENOL) 325 MG tablet Take 650 mg by mouth Every 4 (Four) Hours As Needed for Fever or Pain. Not to exceed 3 gm in 24 hr    ProviderAnthony MD   aluminum-magnesium hydroxide-simethicone (MAALOX/MYLANTA) 200-200-20 MG/5ML suspension Take 30 mL by mouth Every 4 (Four) Hours As Needed for Indigestion.    Anthony Connolly MD   bisacodyl (DULCOLAX) 10 MG suppository Insert 10 mg into the rectum Daily As Needed for Constipation. If no bm from MOM    Anthony Connolly MD   divalproex (DEPAKOTE) 125 MG DR tablet Take 125 mg by mouth Every Morning.    Anthony Connolly MD   divalproex (DEPAKOTE) 125 MG DR tablet Take 2 tablets by mouth Every Night.    Anthony Connolly MD   ketoconazole (NIZORAL) 2 % shampoo Apply  topically to the appropriate area as directed Every 12 (Twelve) Hours As Needed for Dry Skin. To scalp for dry scalp    Anthony Connolly MD   magnesium hydroxide (MILK OF MAGNESIA) 400 MG/5ML suspension Take 30 mL by mouth Daily As Needed for  "Constipation. If no bm in 3 days    Anthony Connolly MD   potassium chloride (MICRO-K) 10 MEQ CR capsule Take 2 capsules by mouth Daily.    Anthony Connolly MD   Prenatal Vit-Fe Fumarate-FA (M-VIT PO) Take 1 tablet by mouth Daily.    Anthony Connolly MD   travoprost, DEDE free, (TRAVATAN) 0.004 % solution ophthalmic solution Administer 1 drop to both eyes Every Evening.    Anthony Connolly MD       Allergies:  Tetracyclines & related    Social History:   Social History     Socioeconomic History   • Marital status:      Spouse name: Not on file   • Number of children: Not on file   • Years of education: Not on file   • Highest education level: Not on file   Tobacco Use   • Smoking status: Never Smoker   • Smokeless tobacco: Never Used   Substance and Sexual Activity   • Alcohol use: Not Currently   • Drug use: Not Currently   • Sexual activity: Defer       Family History:   History reviewed. No pertinent family history.    REVIEW OF SYSTEMS:  14 point review of systems has been reviewed from the patient's emergency room visit, reviewed with the patient on today's date with no new changes.    PHYSICAL EXAM:      Physical Examination:  Vitals:   Vitals:    10/17/20 1504 10/17/20 2029 10/17/20 2309 10/18/20 0350   BP: 148/80 143/77 131/67    BP Location:  Left arm Left arm    Patient Position:  Lying Lying    Pulse:  110 105 71   Resp:  18 18 18   Temp:  97.8 °F (36.6 °C) 97.8 °F (36.6 °C) 97.8 °F (36.6 °C)   TempSrc:  Oral Oral Oral   SpO2:  97% 96% 96%   Weight: 69.9 kg (154 lb)      Height: 172.7 cm (68\")        General:  Appears stated age, no distress.  Orientation:  Alert and oriented to time, place, and person.  Mood and Affect:  Cooperative and pleasant.  Gait:  Resting comfortably in bed.  Cardiovascular:  Symmetric 1-2 plus pulses in upper and lower extremities.  Lymph:  No cervical or inguinal lymphadenopathy noted.  Sensation:  Grossly intact to light touch.  DTR:  Normal, no " pathologic reflexes.  Coordination/balance:  Normal    Musculoskeletal:  Right upper extremity exam:  There is no tenderness to palpation about the shoulder, elbow, wrist or hand.  Unrestricted full function motion is present.  Stability is normal with provocative tests, 5/5 strength, and skin is normal.      Left upper extremity exam:  There is no tenderness to palpation about the shoulder, elbow, wrist or hand.  Unrestricted full function motion is present.  Stability is normal with provocative tests, 5/5 strength, and skin is normal.     Right lower extremity exam:  There is no tenderness to palpation about the hip, knee, ankle or foot.  Unrestricted full function motion is present.  Stability is normal with provocative tests, 5/5 strength, and skin is normal.     Left lower extremity exam:  There is no tenderness to palpation about theknee, ankle or foot, but there is obvious pain about the left hip.  The left lower extremity is shortened and externally rotated.  There is pain with any attempted active or passive range of motion through the left hip.  The overlying skin is intact and the surrounding muscle compartments are soft and compressible.        DATA:    CBC with Differential:    WBC   Date Value Ref Range Status   10/18/2020 13.86 (H) 3.40 - 10.80 10*3/mm3 Final     RBC   Date Value Ref Range Status   10/18/2020 4.31 4.14 - 5.80 10*6/mm3 Final     Hemoglobin   Date Value Ref Range Status   10/18/2020 13.8 13.0 - 17.7 g/dL Final     Hematocrit   Date Value Ref Range Status   10/18/2020 41.7 37.5 - 51.0 % Final     Platelets   Date Value Ref Range Status   10/18/2020 189 140 - 450 10*3/mm3 Final     MCV   Date Value Ref Range Status   10/18/2020 96.8 79.0 - 97.0 fL Final     MCH   Date Value Ref Range Status   10/18/2020 32.0 26.6 - 33.0 pg Final     MCHC   Date Value Ref Range Status   10/18/2020 33.1 31.5 - 35.7 g/dL Final     RDW   Date Value Ref Range Status   10/18/2020 13.6 12.3 - 15.4 % Final      nRBC   Date Value Ref Range Status   10/18/2020 0.0 0.0 - 0.2 /100 WBC Final     Monocytes, Absolute   Date Value Ref Range Status   10/18/2020 1.37 (H) 0.10 - 0.90 10*3/mm3 Final     Lymphocytes, Absolute   Date Value Ref Range Status   10/18/2020 1.06 0.70 - 3.10 10*3/mm3 Final     Eosinophils, Absolute   Date Value Ref Range Status   10/18/2020 0.03 0.00 - 0.40 10*3/mm3 Final     Basophils, Absolute   Date Value Ref Range Status   10/18/2020 0.03 0.00 - 0.20 10*3/mm3 Final     CMP:    Sodium   Date Value Ref Range Status   10/18/2020 137 136 - 145 mmol/L Final     Potassium   Date Value Ref Range Status   10/18/2020 4.2 3.5 - 5.2 mmol/L Final     Comment:     Slight hemolysis detected by analyzer. Results may be affected.     Chloride   Date Value Ref Range Status   10/18/2020 106 98 - 107 mmol/L Final     CO2   Date Value Ref Range Status   10/18/2020 23.0 22.0 - 29.0 mmol/L Final     BUN   Date Value Ref Range Status   10/18/2020 15 8 - 23 mg/dL Final     Creatinine   Date Value Ref Range Status   10/18/2020 0.95 0.76 - 1.27 mg/dL Final     A/G Ratio   Date Value Ref Range Status   10/17/2020 1.3 g/dL Final     Glucose   Date Value Ref Range Status   10/18/2020 120 (H) 65 - 99 mg/dL Final     Calcium   Date Value Ref Range Status   10/18/2020 9.4 8.6 - 10.5 mg/dL Final     Total Bilirubin   Date Value Ref Range Status   10/17/2020 0.5 0.0 - 1.2 mg/dL Final     Alkaline Phosphatase   Date Value Ref Range Status   10/17/2020 82 39 - 117 U/L Final     AST (SGOT)   Date Value Ref Range Status   10/17/2020 28 1 - 40 U/L Final     ALT (SGPT)   Date Value Ref Range Status   10/17/2020 19 1 - 41 U/L Final     BMP:    Sodium   Date Value Ref Range Status   10/18/2020 137 136 - 145 mmol/L Final     Potassium   Date Value Ref Range Status   10/18/2020 4.2 3.5 - 5.2 mmol/L Final     Comment:     Slight hemolysis detected by analyzer. Results may be affected.     Chloride   Date Value Ref Range Status   10/18/2020 106  98 - 107 mmol/L Final     CO2   Date Value Ref Range Status   10/18/2020 23.0 22.0 - 29.0 mmol/L Final     BUN   Date Value Ref Range Status   10/18/2020 15 8 - 23 mg/dL Final     Creatinine   Date Value Ref Range Status   10/18/2020 0.95 0.76 - 1.27 mg/dL Final     Calcium   Date Value Ref Range Status   10/18/2020 9.4 8.6 - 10.5 mg/dL Final     Glucose   Date Value Ref Range Status   10/18/2020 120 (H) 65 - 99 mg/dL Final       ----------------------------------------------------------------------------------------------------------------------  I have reviewed the radiology images above and agree with the findings dictated below    Radiology:   Imaging Results (Last 24 Hours)     Procedure Component Value Units Date/Time    XR Chest 1 View [[id number]] Collected: 10/17/20 1859     Updated: 10/17/20 1903    Narrative:      EXAMINATION: XR CHEST 1 VW-. 10/17/2020 6:59 PM CDT     CHEST, ONE VIEW:     HISTORY: Trauma, femoral neck fracture. Dementia     COMPARISON: None     A single frontal chest radiograph was obtained.     FINDINGS:     The lungs are clear without infiltrates.     The heart is normal in size, pulmonary circulation appropriate, without  heart failure.     The bony structures are intact without acute osseous abnormality.                                     Impression:      1. No acute cardiopulmonary process.     This report was finalized on 10/17/2020 19:00 by Dr. Aditya Parker MD.    XR Hip With or Without Pelvis 2 - 3 View Left [[id number]] Collected: 10/17/20 1458     Updated: 10/17/20 1503    Narrative:      EXAMINATION: XR HIP W OR WO PELVIS 2-3 VIEW LEFT- 10/17/2020 2:58 PM CDT     HISTORY: Left hip fracture, pain.     REPORT: An AP view of the pelvis, 2 separate views of the left hip were  obtained.     COMPARISON: There are no correlative imaging studies for comparison.     The right hip appears normal. There is an impacted complete closed  transverse fracture through the left femoral neck  with a 2 cm the  superior migration of the femur, the femoral head remains in normal  position. There is mild lateral angulation. The acetabulum appears  normal.       Impression:      Acute complete impacted transverse fracture through the left  femoral neck with mild superior migration of the femur and mild lateral  angulation.  This report was finalized on 10/17/2020 14:59 by Dr. Piyush Smiley MD.          ----------------------------------------------------------------------------------------------------------------------  Assessment:  Acute traumatic displaced femoral neck fracture of the left hip, initial encounter for closed fracture    Plan:  1) to OR for anterior left hip hemiarthroplasty - we discussed the risks, benefits, and alternatives and the patient wishes to proceed  2) Admit postop for pain control, PT/OT  3) n.p.o. since midnight    Electronically signed by Juwan Hernandez MD on 10/18/2020 at 07:26 CDT

## 2020-10-19 PROBLEM — I51.89 DIASTOLIC DYSFUNCTION: Status: ACTIVE | Noted: 2020-01-01

## 2020-10-19 NOTE — THERAPY EVALUATION
Patient Name: Gabe Melendez  : 1934    MRN: [id number]                              Today's Date: 10/19/2020       Admit Date: 10/17/2020    Visit Dx:     ICD-10-CM ICD-9-CM   1. Left displaced femoral neck fracture (CMS/HCC)  S72.002A 820.8   2. Dysphagia, unspecified type  R13.10 787.20   3. Impaired functional mobility, balance, gait, and endurance  Z74.09 V49.89     Patient Active Problem List   Diagnosis   • Nursing home resident   • Gait difficulty   • Legal blindness   • Dementia (CMS/HCC)   • Agitation   • Seborrhea   • Glaucoma   • Constipation   • Laboratory test   • Hip fracture (CMS/HCC)   • Wellness examination   • Left displaced femoral neck fracture (CMS/HCC)   • Diastolic dysfunction     Past Medical History:   Diagnosis Date   • Arthropathy, unspecified    • Blind in both eyes    • Dementia (CMS/HCC)    • Glaucoma    • Testicular hypofunction      Past Surgical History:   Procedure Laterality Date   • HIP BIPOLAR REPLACEMENT Left 10/18/2020    Procedure: HIP BIPOLAR ANTERIOR;  Surgeon: Juwan Hernandez MD;  Location: [facility];  Service: Orthopedics;  Laterality: Left;     General Information     Row Name 10/19/20 1050          Physical Therapy Time and Intention    Document Type  evaluation s/p L anterior hayden hip arthroplasty on 10/18/20 after suffering a fall on 10/18/20 resulting in L displaced femoral neck fracture. He has a significant PMH including dementia and he is legally blind.  -MS (r) CC (t) MS (c)     Mode of Treatment  physical therapy  -MS (r) CC (t) MS (c)     Row Name 10/19/20 1050          General Information    Patient Profile Reviewed  yes  -MS (r) CC (t) MS (c)     Existing Precautions/Restrictions  fall;hip, anterior  -MS (r) CC (t) MS (c)     Barriers to Rehab  cognitive status;visual deficit  -MS (r) CC (t) MS (c)     Row Name 10/19/20 1050          Living Environment    Lives With  facility resident  -MS (r) CC (t) MS (c)     Row Name 10/19/20 1050          Home Main  Entrance    Number of Stairs, Main Entrance  none  -MS (r) CC (t) MS (c)     Row Name 10/19/20 1050          Stairs Within Home, Primary    Number of Stairs, Within Home, Primary  none  -MS (r) CC (t) MS (c)     Row Name 10/19/20 1050          Cognition    Orientation Status (Cognition)  oriented to;person;disoriented to;situation;place;time  -MS (r) CC (t) MS (c)     Row Name 10/19/20 1050          Safety Issues, Functional Mobility    Safety Issues Affecting Function (Mobility)  awareness of need for assistance;ability to follow commands;insight into deficits/self-awareness;judgment;problem-solving;safety precautions follow-through/compliance;safety precaution awareness  -MS (r) CC (t) MS (c)     Impairments Affecting Function (Mobility)  balance;cognition;endurance/activity tolerance;pain;strength;visual/perceptual  -MS (r) CC (t) MS (c)     Cognitive Impairments, Mobility Safety/Performance  attention;awareness, need for assistance;insight into deficits/self-awareness;judgment;problem-solving/reasoning;safety precaution awareness;safety precaution follow-through  -MS (r) CC (t) MS (c)       User Key  (r) = Recorded By, (t) = Taken By, (c) = Cosigned By    Initials Name Provider Type    Janeth Belle, PT, DPT, NCS Physical Therapist    CC Bernice Sosa, LASHONDA Student PT Student        Mobility     Row Name 10/19/20 1050          Bed Mobility    Bed Mobility  scooting/bridging;supine-sit;sit-supine  -MS (r) CC (t) MS (c)     Scooting/Bridging [place] (Bed Mobility)  maximum assist (25% patient effort);2 person assist;verbal cues;nonverbal cues (demo/gesture)  -MS (r) CC (t) MS (c)     Supine-Sit [place] (Bed Mobility)  verbal cues;nonverbal cues (demo/gesture);maximum assist (25% patient effort);2 person assist  -MS (r) CC (t) MS (c)     Sit-Supine [place] (Bed Mobility)  verbal cues;nonverbal cues (demo/gesture);maximum assist (25% patient effort);2 person assist  -MS (r) CC (t) MS (c)      Assistive Device (Bed Mobility)  draw sheet;head of bed elevated  -MS (r) CC (t) MS (c)     Row Name 10/19/20 1050          Transfers    Comment (Transfers)  Did not attempt d/t pt refusal.  -MS (r) CC (t) MS (c)     Row Name 10/19/20 1050          Mobility    Extremity Weight-bearing Status  left lower extremity  -MS (r) CC (t) MS (c)     Left Lower Extremity (Weight-bearing Status)  weight-bearing as tolerated (WBAT)  -MS (r) CC (t) MS (c)       User Key  (r) = Recorded By, (t) = Taken By, (c) = Cosigned By    Initials Name Provider Type    MS Janeth Lopez R, PT, DPT, NCS Physical Therapist    CC Bernice Sosa, PT Student PT Student        Obj/Interventions     Row Name 10/19/20 1050          Range of Motion Comprehensive    General Range of Motion  bilateral upper extremity ROM WFL;lower extremity range of motion deficits identified  -MS (r) CC (t) MS (c)     Comment, General Range of Motion  Difficult to assess d/t pt unwillingness. LLE ~75% impaired d/t pain; RLE ~50% impaired d/t pain.  -MS (r) CC (t) MS (c)     Row Name 10/19/20 1050          Strength Comprehensive (MMT)    Comment, General Manual Muscle Testing (MMT) Assessment  Difficult to assess d/t pt unwillingness. RLE grossly 3+/5; LLE not assessed d/t pt refusal.  -MS (r) CC (t) MS (c)     Row Name 10/19/20 1050          Balance    Balance Assessment  sitting static balance;sitting dynamic balance  -MS (r) CC (t) MS (c)     Static Sitting Balance  WFL;sitting, edge of bed  -MS (r) CC (t) MS (c)     Dynamic Sitting Balance  mild impairment;sitting, edge of bed  -MS (r) CC (t) MS (c)     Balance Interventions  sitting  -MS (r) CC (t) MS (c)     Row Name 10/19/20 1050          Sensory Assessment (Somatosensory)    Sensory Assessment (Somatosensory)  sensation intact Pt poor historian  -MS (r) CC (t) MS (c)       User Key  (r) = Recorded By, (t) = Taken By, (c) = Cosigned By    Initials Name Provider Type    Janeth Belle R, PT, DPT, NCS Physical  Therapist    Bernice Celestin, PT Student PT Student        Goals/Plan     Row Name 10/19/20 1050          Bed Mobility Goal 1 (PT)    Activity/Assistive Device (Bed Mobility Goal 1, PT)  bed mobility activities, all  -MS (r) CC (t) MS (c)     [place] Level/Cues Needed (Bed Mobility Goal 1, PT)  minimum assist (75% or more patient effort);2 person assist  -MS (r) CC (t) MS (c)     Time Frame (Bed Mobility Goal 1, PT)  long term goal (LTG);by discharge  -MS (r) CC (t) MS (c)     Progress/Outcomes (Bed Mobility Goal 1, PT)  goal ongoing  -MS (r) CC (t) MS (c)     Row Name 10/19/20 1050          Transfer Goal 1 (PT)    Activity/Assistive Device (Transfer Goal 1, PT)  sit-to-stand/stand-to-sit;bed-to-chair/chair-to-bed  -MS (r) CC (t) MS (c)     [place] Level/Cues Needed (Transfer Goal 1, PT)  minimum assist (75% or more patient effort);2 person assist  -MS (r) CC (t) MS (c)     Time Frame (Transfer Goal 1, PT)  long term goal (LTG);by discharge  -MS (r) CC (t) MS (c)     Progress/Outcome (Transfer Goal 1, PT)  goal ongoing  -MS (r) CC (t) MS (c)     Row Name 10/19/20 1050          Gait Training Goal 1 (PT)    Activity/Assistive Device (Gait Training Goal 1, PT)  gait (walking locomotion);walker, rolling  -MS (r) CC (t) MS (c)     [place] Level (Gait Training Goal 1, PT)  minimum assist (75% or more patient effort)  -MS (r) CC (t) MS (c)     Distance (Gait Training Goal 1, PT)  20'  -MS (r) CC (t) MS (c)     Time Frame (Gait Training Goal 1, PT)  long term goal (LTG);by discharge  -MS (r) CC (t) MS (c)     Progress/Outcome (Gait Training Goal 1, PT)  goal ongoing  -MS (r) CC (t) MS (c)       User Key  (r) = Recorded By, (t) = Taken By, (c) = Cosigned By    Initials Name Provider Type    Janeth Belle, PT, DPT, NCS Physical Therapist    CC Bernice Sosa, PT Student PT Student        Clinical Impression     Row Name 10/19/20 1050          Pain    Additional Documentation  Pain Scale: FACES  "Pre/Post-Treatment (Group)  -MS (r) CC (t) MS (c)     Row Name 10/19/20 1050          Pain Scale: Numbers Pre/Post-Treatment    Pain Location - Side  Left  -MS (r) CC (t) MS (c)     Pain Location  hip  -MS (r) CC (t) MS (c)     Pain Intervention(s)  Medication (See MAR);Repositioned;Ambulation/increased activity  -MS (r) CC (t) MS (c)     Row Name 10/19/20 1050          Pain Scale: FACES Pre/Post-Treatment    Pain: FACES Scale, Pretreatment  2-->hurts little bit  -MS (r) CC (t) MS (c)     Posttreatment Pain Rating  2-->hurts little bit  -MS (r) CC (t) MS (c)     Row Name 10/19/20 1050          Plan of Care Review    Plan of Care Reviewed With  patient  -MS (r) CC (t) MS (c)     Progress  no change  -MS (r) CC (t) MS (c)     Outcome Summary  PT eval completed. Pt oriented to person only. Pt was very confused and uncooperative throughout session. He was Max A x2 for all bed mobility. Pt refused any formal testing because he \"just wants to sleep\". He sat EOB with CGA to maintain sitting balance. Pt tried to lay down from sitting multiple times and required Mod A to stay sitting up EOB. He refused to attempt to stand EOB d/t back pain and pt wanted to sleep. Pt would benefit from skilled PT in order to address strength deficits, pain, and improve functional mobility. Recommend d/c back to SNF upon d/c from facility.  -MS (r) CC (t) MS (c)     Row Name 10/19/20 1050          Therapy Assessment/Plan (PT)    Patient/Family Therapy Goals Statement (PT)  Pt did not state.  -MS (r) CC (t) MS (c)     Rehab Potential (PT)  fair, will monitor progress closely  -MS (r) CC (t) MS (c)     Criteria for Skilled Interventions Met (PT)  yes;skilled treatment is necessary  -MS (r) CC (t) MS (c)     Predicted Duration of Therapy Intervention (PT)  until d/c  -MS (r) CC (t) MS (c)     Row Name 10/19/20 1050          Positioning and Restraints    Pre-Treatment Position  in bed  -MS (r) CC (t) MS (c)     Post Treatment Position  bed  -MS " (r) CC (t) MS (c)     In Bed  fowlers;call light within reach;encouraged to call for assist;exit alarm on;side rails up x3  -MS (r) CC (t) MS (c)       User Key  (r) = Recorded By, (t) = Taken By, (c) = Cosigned By    Initials Name Provider Type    Janeth Belle R, PT, DPT, NCS Physical Therapist    Bernice Celestin, PT Student PT Student        Outcome Measures     Row Name 10/19/20 1050          How much help from another person do you currently need...    Turning from your back to your side while in flat bed without using bedrails?  2  -MS (r) CC (t) MS (c)     Moving from lying on back to sitting on the side of a flat bed without bedrails?  2  -MS (r) CC (t) MS (c)     Moving to and from a bed to a chair (including a wheelchair)?  2  -MS (r) CC (t) MS (c)     Standing up from a chair using your arms (e.g., wheelchair, bedside chair)?  2  -MS (r) CC (t) MS (c)     Climbing 3-5 steps with a railing?  1  -MS (r) CC (t) MS (c)     To walk in hospital room?  1  -MS (r) CC (t) MS (c)     AM-PAC 6 Clicks Score (PT)  10  -SELMA (r) CC (t)     Row Name 10/19/20 1050          Functional Assessment    Outcome Measure Options  AM-PAC 6 Clicks Basic Mobility (PT)  -MS (r) CC (t) MS (c)       User Key  (r) = Recorded By, (t) = Taken By, (c) = Cosigned By    Initials Name Provider Type    Janeth Belle R, PT, DPT, NCS Physical Therapist    SELMA Maranda Becerril, RN Registered Nurse    Bernice Celestin, PT Student PT Student        Physical Therapy Education                 Title: PT OT SLP Therapies (In Progress)     Topic: Physical Therapy (In Progress)     Point: Mobility training (Done)     Learning Progress Summary           Patient Acceptance, E, VU,NR by CC at 10/19/2020 1200    Comment: Pt educated on PT's role in POC and anterior hip precautions.                   Point: Home exercise program (Not Started)     Learner Progress:  Not documented in this visit.          Point: Body mechanics (Not Started)      "Learner Progress:  Not documented in this visit.          Point: Precautions (Done)     Learning Progress Summary           Patient Acceptance, E, VU,NR by  at 10/19/2020 1200    Comment: Pt educated on PT's role in POC and anterior hip precautions.                               User Key     Initials Effective Dates Name Provider Type Discipline     09/02/20 -  Bernice Sosa, PT Student PT Student PT              PT Recommendation and Plan  Planned Therapy Interventions (PT): balance training, bed mobility training, gait training, patient/family education, strengthening, transfer training  Plan of Care Reviewed With: patient  Progress: no change  Outcome Summary: PT eval completed. Pt oriented to person only. Pt was very confused and uncooperative throughout session. He was Max A x2 for all bed mobility. Pt refused any formal testing because he \"just wants to sleep\". He sat EOB with CGA to maintain sitting balance. Pt tried to lay down from sitting multiple times and required Mod A to stay sitting up EOB. He refused to attempt to stand EOB d/t back pain and pt wanted to sleep. Pt would benefit from skilled PT in order to address strength deficits, pain, and improve functional mobility. Recommend d/c back to SNF upon d/c from facility.     Time Calculation:   PT Charges     Row Name 10/19/20 1050             Time Calculation    Start Time  1050 with 5 minute chart review  -MS (r) CC (t) MS (c)      Stop Time  1110  -MS (r) CC (t) MS (c)      Time Calculation (min)  20 min  -MS (r) CC (t)      PT Received On  10/19/20  -MS (r) CC (t) MS (c)      PT Goal Re-Cert Due Date  10/29/20  -MS (r) CC (t) MS (c)        User Key  (r) = Recorded By, (t) = Taken By, (c) = Cosigned By    Initials Name Provider Type    MS John Janeth R, PT, DPT, NCS Physical Therapist     Bernice Sosa, PT Student PT Student        Therapy Charges for Today     Code Description Service Date Service Provider Modifiers Qty    " [id number] HC PT EVAL MOD COMPLEXITY 2 10/19/2020 Bernice Sosa, PT Student GP 1          PT G-Codes  Outcome Measure Options: AM-PAC 6 Clicks Basic Mobility (PT)  AM-PAC 6 Clicks Score (PT): 10    Bernice Sosa, PT Student  10/19/2020

## 2020-10-19 NOTE — NURSING NOTE
[facility] Note      Patient: Gabe Melendez  MRN: [id number] : 1934         Problem List:   Patient Active Problem List    Diagnosis   • *Left displaced femoral neck fracture (CMS/HCC) [S72.002A]   • Diastolic dysfunction [I51.89]   • Hip fracture (CMS/HCC) [S72.009A]   • Wellness examination [Z00.00]   • Nursing home resident [Z59.3]   • Gait difficulty [R26.9]   • Legal blindness [H54.8]   • Dementia (CMS/HCC) [F03.90]   • Agitation [R45.1]   • Seborrhea [L21.9]   • Glaucoma [H40.9]   • Constipation [K59.00]   • Laboratory test [Z01.89]         Reason for Visit: Patient is an 86 y.o. male, being seen by [facility] for prevention of skin ulcers.     Patient presents laying in bed on his back while nursing staff is cleaning the patient and changing bedding.  The patient has a history of dementia and is legally blind.  He gets agitated when turning.  He fell when at the nursing home and hit his head resulting in lacerations to his head and posterior scalp along with a fracture of the left femur.  Dr. Hernandez repaired the left femur on 10/18.      Patient has no pressure injuries or skin irritation, but he is at risk due to decreased sensory perception, moisture due to being incontinent, activity due to be bed bound, mobility due to not being able to ambulate or move well in bed.  Condom cath placed during assessment and patient removed prior to me leaving the room.  He does try to remove things often so a brief is appropriate for this patient, it just needs to be changed often to promote a dry environment.  Turned patient to right side prior to leaving room.       Sensory Perception: 3-->slightly limited (10/18/20 2000 : Bethany Dyole, RN)  Moisture: 3-->occasionally moist (10/18/20 2000 : Bethany Doyle, RN)  Activity: 2-->chairfast (10/18/20 2000 : Bethany Doyle, RN)  Mobility: 2-->very limited (10/18/20 2000 : Bethany Doyle, RN)  Nutrition: 3-->adequate (10/18/20 2000 : Bethany Doyle, RN)  Friction and Shear:  2-->potential problem (10/18/20 2000 : Bethany Doyle, RN)  Kirk Score: 15 (10/18/20 2000 : Bethany Doyle, RN)       Recommendations:    - Elevate Heels Off of Bed  - Turn Patient Q2H  - Use Repositioning Sheet and Wedges to Position Patient  - Use Seat Cushion When Up In Chair  - Apply Moisture Barrier After Any Incontinence   - Hydraguard  - Change brief often to promote a dry environment    Will consider [facility] if turning patient becomes an issue.     )Nataliya Mcbride, NICANOR 10/19/2020

## 2020-10-19 NOTE — PLAN OF CARE
"  Problem: Adult Inpatient Plan of Care  Goal: Plan of Care Review  Outcome: Ongoing, Progressing  Flowsheets (Taken 10/19/2020 1050)  Progress: no change (Pended)  Plan of Care Reviewed With: patient (Pended)  Outcome Summary: PT eval completed. Pt oriented to person only. Pt was very confused and uncooperative throughout session. He was Max A x2 for all bed mobility. Pt refused any formal testing because he \"just wants to sleep\". He sat EOB with CGA to maintain sitting balance. Pt tried to lay down from sitting multiple times and required Mod A to stay sitting up EOB. He refused to attempt to stand EOB d/t back pain and pt wanted to sleep. Pt would benefit from skilled PT in order to address strength deficits, pain, and improve functional mobility. Recommend d/c back to SNF upon d/c from facility. (Pended)     "

## 2020-10-19 NOTE — THERAPY EVALUATION
Patient Name: Gabe Melendez  : 1934    MRN: [id number]                              Today's Date: 10/19/2020       Admit Date: 10/17/2020    Visit Dx:     ICD-10-CM ICD-9-CM   1. Left displaced femoral neck fracture (CMS/HCC)  S72.002A 820.8   2. Dysphagia, unspecified type  R13.10 787.20   3. Impaired functional mobility, balance, gait, and endurance  Z74.09 V49.89     Patient Active Problem List   Diagnosis   • Nursing home resident   • Gait difficulty   • Legal blindness   • Dementia (CMS/HCC)   • Agitation   • Seborrhea   • Glaucoma   • Constipation   • Laboratory test   • Hip fracture (CMS/HCC)   • Wellness examination   • Left displaced femoral neck fracture (CMS/HCC)   • Diastolic dysfunction     Past Medical History:   Diagnosis Date   • Arthropathy, unspecified    • Blind in both eyes    • Dementia (CMS/HCC)    • Glaucoma    • Testicular hypofunction      Past Surgical History:   Procedure Laterality Date   • HIP BIPOLAR REPLACEMENT Left 10/18/2020    Procedure: HIP BIPOLAR ANTERIOR;  Surgeon: Juwan Hernandez MD;  Location: [facility];  Service: Orthopedics;  Laterality: Left;     General Information     Row Name 10/19/20 1101          OT Time and Intention    Document Type  evaluation Pt presents after falling at NH ([facility]).  Hx: dementia and blindness; DX: L femoral neck fx, scalp laceration s/p 10/18/20 L anterior bipolar hip arthroplasty  -CS     Mode of Treatment  occupational therapy  -CS     Row Name 10/19/20 1101          General Information    Patient Profile Reviewed  yes  -CS     Prior Level of Function  -- unknown due to pt's cognitive status  -CS     Existing Precautions/Restrictions  fall;left;hip, anterior WBAT LLE  -CS     Barriers to Rehab  medically complex;previous functional deficit;cognitive status;visual deficit  -CS     Row Name 10/19/20 1101          Living Environment    Lives With  facility resident  -CS     Row Name 10/19/20 1101          Cognition     Orientation Status (Cognition)  oriented to;person;disoriented to;place;situation;time  -CS     Row Name 10/19/20 1101          Safety Issues, Functional Mobility    Safety Issues Affecting Function (Mobility)  ability to follow commands;awareness of need for assistance;insight into deficits/self-awareness;impulsivity;judgment;safety precaution awareness;safety precautions follow-through/compliance;sequencing abilities  -CS     Impairments Affecting Function (Mobility)  cognition;endurance/activity tolerance;pain;postural/trunk control;strength;visual/perceptual  -CS     Cognitive Impairments, Mobility Safety/Performance  awareness, need for assistance;impulsivity;insight into deficits/self-awareness;judgment;problem-solving/reasoning;safety precaution awareness;safety precaution follow-through  -CS       User Key  (r) = Recorded By, (t) = Taken By, (c) = Cosigned By    Initials Name Provider Type    CS Bernice Girard S, OTR/L, CNT Occupational Therapist        Mobility/ADL's     Row Name 10/19/20 1100          Bed Mobility    Bed Mobility  supine-sit;sit-supine;scooting/bridging  -CS     Scooting/Bridging [place] (Bed Mobility)  maximum assist (25% patient effort);2 person assist;verbal cues;nonverbal cues (demo/gesture) scooting pt up in bed while pt supine  -CS     Supine-Sit [place] (Bed Mobility)  maximum assist (25% patient effort);2 person assist;nonverbal cues (demo/gesture);verbal cues  -CS     Sit-Supine [place] (Bed Mobility)  maximum assist (25% patient effort);2 person assist;nonverbal cues (demo/gesture);verbal cues  -CS     Bed Mobility, Safety Issues  cognitive deficits limit understanding;decreased use of legs for bridging/pushing  -CS     Assistive Device (Bed Mobility)  draw sheet;head of bed elevated;bed rails  -CS     Comment (Bed Mobility)  Upon sitting up at the EOB, pt impulsive to lay back down.  OT attempted for pt to engage in grooming, arm exercises, converstaion, etc.   however pt adamant on lying down.  Pt unable to scoot himself toward the EOB while in sitting position.  -     Row Name 10/19/20 1100          Transfers    Comment (Transfers)  Unable to asses transfers due to pt's cognitive status and being adamant on transitioning back to supine in bed.  -     Row Name 10/19/20 1100          Functional Mobility    Functional Mobility- Ind. Level  not tested;unable to perform  -     Row Name 10/19/20 1100          Activities of Daily Living    BADL Assessment/Intervention  lower body dressing;upper body dressing  -     Row Name 10/19/20 1100          Mobility    Left Lower Extremity (Weight-bearing Status)  weight-bearing as tolerated (WBAT)  -     Row Name 10/19/20 1100          Lower Body Dressing Assessment/Training    [place] Level (Lower Body Dressing)  don;doff;socks;maximum assist (25% patient effort)  -     Position (Lower Body Dressing)  edge of bed sitting  -     Row Name 10/19/20 1100          Upper Body Dressing Assessment/Training    [place] Level (Upper Body Dressing)  don;doff;maximum assist (25% patient effort) hospital gown  -     Position (Upper Body Dressing)  supine  -       User Key  (r) = Recorded By, (t) = Taken By, (c) = Cosigned By    Initials Name Provider Type     Bernice Girard S, OTR/L, CNT Occupational Therapist        Obj/Interventions     [facility] Name 10/19/20 1100          Sensory Assessment (Somatosensory)    Sensory Assessment (Somatosensory)  UE sensation intact  -     Row Name 10/19/20 1100          Vision Assessment/Intervention    Visual Impairment/Limitations  (S) legally blind  -     Row Name 10/19/20 1100          Range of Motion Comprehensive    General Range of Motion  bilateral upper extremity ROM WNL  -     Row Name 10/19/20 1100          Strength Comprehensive (MMT)    Comment, General Manual Muscle Testing (MMT) Assessment  Pt's BUE functional strength: 3+/5 however it is difficult to asses due to pt's  command following difficulty and cognitive status  -CS     Row Name 10/19/20 1100          Balance    Balance Assessment  sitting static balance;sitting dynamic balance  -CS     Static Sitting Balance  mild impairment;sitting, edge of bed  -CS     Dynamic Sitting Balance  mild impairment;sitting, edge of bed  -CS     Comment, Balance  Pt able to maintain sitting balance on with SBA/CGA for short period, however he was impulsive to transition back to supine therefore he required assist for safety.  -CS       User Key  (r) = Recorded By, (t) = Taken By, (c) = Cosigned By    Initials Name Provider Type    CS Bernice Girard S, OTR/L, CNT Occupational Therapist        Goals/Plan     Row Name 10/19/20 1100          Transfer Goal 1 (OT)    Activity/Assistive Device (Transfer Goal 1, OT)  sit-to-stand/stand-to-sit;bed-to-chair/chair-to-bed;toilet  -CS     [place] Level/Cues Needed (Transfer Goal 1, OT)  minimum assist (75% or more patient effort)  -CS     Time Frame (Transfer Goal 1, OT)  long term goal (LTG)  -CS     Progress/Outcome (Transfer Goal 1, OT)  goal ongoing  -     Row Name 10/19/20 1100          Dressing Goal 1 (OT)    Activity/Device (Dressing Goal 1, OT)  upper body dressing;lower body dressing  -CS     [place]/Cues Needed (Dressing Goal 1, OT)  minimum assist (75% or more patient effort);verbal cues required;tactile cues required  -CS     Time Frame (Dressing Goal 1, OT)  long term goal (LTG)  -CS     Progress/Outcome (Dressing Goal 1, OT)  goal ongoing  -     Row Name 10/19/20 1100          Toileting Goal 1 (OT)    Activity/Device (Toileting Goal 1, OT)  toileting skills, all;commode, bedside without drop arms;commode  -CS     [place] Level/Cues Needed (Toileting Goal 1, OT)  minimum assist (75% or more patient effort)  -CS     Time Frame (Toileting Goal 1, OT)  long term goal (LTG)  -CS     Progress/Outcome (Toileting Goal 1, OT)  goal ongoing  -     Row Name 10/19/20 1100           Therapy Assessment/Plan (OT)    Planned Therapy Interventions (OT)  activity tolerance training;BADL retraining;cognitive/visual perception retraining;functional balance retraining;occupation/activity based interventions;patient/caregiver education/training;transfer/mobility retraining  -CS       User Key  (r) = Recorded By, (t) = Taken By, (c) = Cosigned By    Initials Name Provider Type    CS Bernice Girard S, OTR/L, CNT Occupational Therapist        Clinical Impression     Row Name 10/19/20 1100          Pain Assessment    Additional Documentation  Pain Scale: FACES Pre/Post-Treatment (Group)  -CS     Row Name 10/19/20 1100          Pain Scale: FACES Pre/Post-Treatment    Pain: FACES Scale, Pretreatment  0-->no hurt  -CS     Posttreatment Pain Rating  4-->hurts little more  -CS     Pain Location - Side  Left  -CS     Pain Location  hip  -CS     Row Name 10/19/20 1100          Plan of Care Review    Plan of Care Reviewed With  patient  -CS     Progress  no change  -CS     Outcome Summary  OT evaluation completed.  Pt demo need for continued OT serviecs.  Pt is very confused, oriented to himself, and engaged in tangential conversation throughout evaluation.  Pt required max A x2 for all bed mobility.  Once pt sitting EOB, he was able to maintain his balance for short time however he required assist due to impulsivity to transition back to supine in bed.  Pt max A for LB and UB dressing this date. OT attempted to engage pt in exercise, ADLs, and just conversation while sitting EOB, however pt was adamant to lay back down.  OT will cont to follow however his progress will be monitored closely to determine if skilled OT continues to be warranted based on his level of participation and cognitive level.  It is recommended for pt to discharge back to SNF upon discharge from hospital.  -CS     Row Name 10/19/20 1100          Therapy Assessment/Plan (OT)    Patient/Family Therapy Goal Statement (OT)  pt did not state  -CS      Rehab Potential (OT)  fair, will monitor progress closely  -CS     Criteria for Skilled Therapeutic Interventions Met (OT)  yes;skilled treatment is necessary  -CS     Therapy Frequency (OT)  3 times/wk  -CS     Predicted Duration of Therapy Intervention (OT)  until hospital discharge  -CS     Row Name 10/19/20 1100          Therapy Plan Review/Discharge Plan (OT)    Anticipated Discharge Disposition (OT)  skilled nursing facility  -CS     Row Name 10/19/20 1100          Positioning and Restraints    Pre-Treatment Position  in bed  -CS     Post Treatment Position  bed  -CS     In Bed  notified nsg;fowlers;call light within reach;encouraged to call for assist;exit alarm on;side rails up x2;SCD pump applied;heels elevated  -CS       User Key  (r) = Recorded By, (t) = Taken By, (c) = Cosigned By    Initials Name Provider Type    Bernice Santiago OTR/L, MARK Occupational Therapist        Outcome Measures     Row Name 10/19/20 1100          How much help from another is currently needed...    Putting on and taking off regular lower body clothing?  2  -CS     Bathing (including washing, rinsing, and drying)  2  -CS     Toileting (which includes using toilet bed pan or urinal)  1  -CS     Putting on and taking off regular upper body clothing  2  -CS     Taking care of personal grooming (such as brushing teeth)  3  -CS     Eating meals  3  -CS     AM-PAC 6 Clicks Score (OT)  13  -CS     Row Name 10/19/20 1100          Functional Assessment    Outcome Measure Options  AM-PAC 6 Clicks Daily Activity (OT)  -CS       User Key  (r) = Recorded By, (t) = Taken By, (c) = Cosigned By    Initials Name Provider Type    Bernice Santiago OTR/L, MARK Occupational Therapist        Occupational Therapy Education                 Title: PT OT SLP Therapies (In Progress)     Topic: Occupational Therapy (In Progress)     Point: ADL training (In Progress)     Description:   Instruct learner(s) on proper safety adaptation and  remediation techniques during self care or transfers.   Instruct in proper use of assistive devices.              Learning Progress Summary           Patient Nonacceptance, E, NR,NL by  at 10/19/2020 1417                   Point: Precautions (Not Started)     Description:   Instruct learner(s) on prescribed precautions during self-care and functional transfers.              Learner Progress:  Not documented in this visit.                      User Key     Initials Effective Dates Name Provider Type Discipline     04/02/19 -  Bernice Girard, OTR/L, CNT Occupational Therapist OT              OT Recommendation and Plan  Planned Therapy Interventions (OT): activity tolerance training, BADL retraining, cognitive/visual perception retraining, functional balance retraining, occupation/activity based interventions, patient/caregiver education/training, transfer/mobility retraining  Therapy Frequency (OT): 3 times/wk  Plan of Care Review  Plan of Care Reviewed With: patient  Progress: no change  Outcome Summary: OT evaluation completed.  Pt demo need for continued OT serviecs.  Pt is very confused, oriented to himself, and engaged in tangential conversation throughout evaluation.  Pt required max A x2 for all bed mobility.  Once pt sitting EOB, he was able to maintain his balance for short time however he required assist due to impulsivity to transition back to supine in bed.  Pt max A for LB and UB dressing this date. OT attempted to engage pt in exercise, ADLs, and just conversation while sitting EOB, however pt was adamant to lay back down.  OT will cont to follow however his progress will be monitored closely to determine if skilled OT continues to be warranted based on his level of participation and cognitive level.  It is recommended for pt to discharge back to SNF upon discharge from hospital.     Time Calculation:   Time Calculation- OT     Row Name 10/19/20 1100             Time Calculation- OT    OT Start  Time  1100  -CS      OT Stop Time  1139  -CS      OT Time Calculation (min)  39 min  -CS      OT Received On  10/19/20  -CS      OT Goal Re-Cert Due Date  10/29/20  -        User Key  (r) = Recorded By, (t) = Taken By, (c) = Cosigned By    Initials Name Provider Type    CS Bernice Girard OTR/L, MARK Occupational Therapist        Therapy Charges for Today     Code Description Service Date Service Provider Modifiers Qty    [id number]  OT EVAL MOD COMPLEXITY 3 10/19/2020 Bernice Girard OTR/L, MARK GO 1               Bernice Girard OTR/L, MARK  10/19/2020

## 2020-10-19 NOTE — PLAN OF CARE
"10/19/20: Discussed plan and goals with spouse, [place] regarding interventions. She does not want any medical interventions. She states she would want him kept comfortable if needed. She said she wants \"nothing done that when it is his time to go he will go naturally\". She stated that \"he wouldn't want anybody to see him that way\". She has known him since 1981 and said they have been happy and had a good life. She stated they have discussed his wishes in the past. She said she only wants what is best for him.     He currently has limited interventions on code status.     Sarah Suiter   10/19/20  "

## 2020-10-19 NOTE — PLAN OF CARE
Problem: Adult Inpatient Plan of Care  Goal: Plan of Care Review  Outcome: Ongoing, Progressing  Flowsheets (Taken 10/19/2020 1631)  Progress: no change  Plan of Care Reviewed With:   patient   family  Outcome Summary: Ntn assessment completed. Regualar diet, oral intak eavg 58% of three meals. Pt ate 50% with breakfast this morning, only bites with lunch. Pt is feeder w/ meals. Boost Plus ice cream shake with lunch and dinner added, magic cup with breakfast daily added. Suggest daily wts to monitor for weight trend. Cont to follow for plan of care.

## 2020-10-19 NOTE — PLAN OF CARE
Problem: Adult Inpatient Plan of Care  Goal: Plan of Care Review  Recent Flowsheet Documentation  Taken 10/19/2020 1100 by Bernice Girard, OTR/L, CNT  Progress: no change  Plan of Care Reviewed With: patient  Outcome Summary: OT evaluation completed.  Pt demo need for continued OT serviecs.  Pt is very confused, oriented to himself, and engaged in tangential conversation throughout evaluation.  Pt required max A x2 for all bed mobility.  Once pt sitting EOB, he was able to maintain his balance for short time however he required assist due to impulsivity to transition back to supine in bed.  Pt max A for LB and UB dressing this date. OT attempted to engage pt in exercise, ADLs, and just conversation while sitting EOB, however pt was adamant to lay back down.  OT will cont to follow however his progress will be monitored closely to determine if skilled OT continues to be warranted based on his level of participation and cognitive level.  It is recommended for pt to discharge back to SNF upon discharge from hospital.

## 2020-10-19 NOTE — PROGRESS NOTES
Orthopedic Surgery Progress Note    Gabe Melendez  10/19/2020      Subjective:     Systemic or Specific Complaints: Patient resting comfortably in bed this a.m.  No reports of any overnight issues.  Patient still demented to his baseline.    Objective:     Patient Vitals for the past 24 hrs:   BP Temp Temp src Pulse Resp SpO2   10/19/20 0312 116/59 98 °F (36.7 °C) Oral 70 16 97 %   10/18/20 2319 101/52 98 °F (36.7 °C) Oral 77 16 97 %   10/18/20 2100 127/70 98 °F (36.7 °C) Oral 74 16 97 %   10/18/20 1500 128/64 98 °F (36.7 °C) Temporal 88 14 96 %   10/18/20 1135 135/58 97.8 °F (36.6 °C) Temporal 98 14 95 %   10/18/20 1133 -- -- -- 102 -- 94 %   10/18/20 1128 (!) 167/119 98 °F (36.7 °C) Temporal 116 15 94 %   10/18/20 1118 162/64 -- -- 116 15 97 %   10/18/20 1113 (!) 182/62 -- -- 117 15 99 %   10/18/20 1108 (!) 205/177 -- -- 112 15 91 %   10/18/20 1103 (!) 155/123 98.6 °F (37 °C) Temporal 115 15 93 %       left lower  General: alert, appears stated age and cooperative   Wound: clean, dry, intact             Dressing: clean, dry, intact   Extremity: Distal NVI           DVT Exam: No evidence of DVT seen on physical exam.                   Data Review:  Lab Results (last 24 hours)     Procedure Component Value Units Date/Time    Basic Metabolic Panel [[id number]]  (Abnormal) Collected: 10/19/20 0606    Specimen: Blood Updated: 10/19/20 0654     Glucose 108 mg/dL      BUN 19 mg/dL      Creatinine 1.10 mg/dL      Sodium 138 mmol/L      Potassium 3.5 mmol/L      Chloride 110 mmol/L      CO2 19.0 mmol/L      Calcium 9.2 mg/dL      eGFR Non African Amer 63 mL/min/1.73      BUN/Creatinine Ratio 17.3     Anion Gap 9.0 mmol/L     Narrative:      GFR Normal >60  Chronic Kidney Disease <60  Kidney Failure <15      CBC & Differential [[id number]]  (Abnormal) Collected: 10/19/20 0606    Specimen: Blood Updated: 10/19/20 0634    Narrative:      The following orders were created for panel order CBC & Differential.  Procedure                                Abnormality         Status                     ---------                               -----------         ------                     CBC Auto Differential[[id number]]        Abnormal            Final result                 Please view results for these tests on the individual orders.    CBC Auto Differential [[id number]]  (Abnormal) Collected: 10/19/20 0606    Specimen: Blood Updated: 10/19/20 0634     WBC 12.23 10*3/mm3      RBC 3.69 10*6/mm3      Hemoglobin 11.9 g/dL      Hematocrit 35.5 %      MCV 96.2 fL      MCH 32.2 pg      MCHC 33.5 g/dL      RDW 13.6 %      RDW-SD 47.8 fl      MPV 10.8 fL      Platelets 159 10*3/mm3      Neutrophil % 79.8 %      Lymphocyte % 6.5 %      Monocyte % 12.8 %      Eosinophil % 0.0 %      Basophil % 0.2 %      Immature Grans % 0.7 %      Neutrophils, Absolute 9.75 10*3/mm3      Lymphocytes, Absolute 0.80 10*3/mm3      Monocytes, Absolute 1.57 10*3/mm3      Eosinophils, Absolute 0.00 10*3/mm3      Basophils, Absolute 0.02 10*3/mm3      Immature Grans, Absolute 0.09 10*3/mm3      nRBC 0.0 /100 WBC         Imaging Results (Last 24 Hours)     Procedure Component Value Units Date/Time    XR Hip With or Without Pelvis 2 - 3 View Left [[id number]] Collected: 10/18/20 1207     Updated: 10/19/20 0659    Narrative:      EXAMINATION: XR HIP W OR WO PELVIS 2-3 VIEW LEFT- 10/18/2020 12:07 PM  CDT     HISTORY: bipolar anterior; S72.002A-Fracture of unspecified part of neck  of left femur, initial encounter for closed fracture; R13.10-Dysphagia,  unspecified.     Fluoroscopy time: 4 seconds.     Radiation dose: 0.9 mGy     Number fluoroscopic images acquired: 3.     REPORT: 3 intraoperative fluoroscopic spot views were obtained under the  supervision of the orthopedic surgery service, demonstrating left hip  arthroplasty with satisfactory position of the prosthesis and normal  alignment. The images are stored in PACS for review.  This report was finalized on 10/18/2020 12:08 by  Dr. Piyush Smiley MD.    FL C Arm During Surgery [[id number]] Collected: 10/18/20 1207     Updated: 10/19/20 0659    Narrative:      EXAMINATION: XR HIP W OR WO PELVIS 2-3 VIEW LEFT- 10/18/2020 12:07 PM  CDT     HISTORY: bipolar anterior; S72.002A-Fracture of unspecified part of neck  of left femur, initial encounter for closed fracture; R13.10-Dysphagia,  unspecified.     Fluoroscopy time: 4 seconds.     Radiation dose: 0.9 mGy     Number fluoroscopic images acquired: 3.     REPORT: 3 intraoperative fluoroscopic spot views were obtained under the  supervision of the orthopedic surgery service, demonstrating left hip  arthroplasty with satisfactory position of the prosthesis and normal  alignment. The images are stored in PACS for review.  This report was finalized on 10/18/2020 12:08 by Dr. Piyush Smiley MD.    XR Hip With or Without Pelvis 2 - 3 View Left [[id number]] Collected: 10/18/20 1149     Updated: 10/18/20 1153    Narrative:      EXAMINATION: XR HIP W OR WO PELVIS 2-3 VIEW LEFT- 10/18/2020 11:49 AM  CDT     HISTORY: Post-Op Hip Arthroplasty; S72.002A-Fracture of unspecified part  of neck of left femur, initial encounter for closed fracture;  R13.10-Dysphagia, unspecified.     REPORT: An AP view of the pelvis, 2 separate views left hip were  obtained.     COMPARISON: AP x-rays of the pelvis 10/17/2020.     The patient has undergone left hip arthroplasty, the prosthesis is well  seated and alignment appears normal. The joint spaces are preserved. The  right hip is unremarkable. There moderate degenerative changes in the  lower lumbar spine.       Impression:      Status post left hip arthroplasty with satisfactory  positioning of the prosthesis and normal alignment.  This report was finalized on 10/18/2020 11:50 by Dr. Piyush Smiley MD.          Assessment:     POD# 1 status post left hip anterior hip hemiarthroplasty secondary to a displaced left femoral neck fracture    Plan:      1:  DVT prophylaxis,  ICE, elevate  2:  Pain control  3:  Physical therapy/Occupational therapy  4:  Anticipate discharge once placement arranged in a skilled nursing facility and if pain well controlled  5:  Weightbearing as tolerated with anterior hip precautions to the left lower extremity      Juwan Hernandez MD

## 2020-10-19 NOTE — THERAPY TREATMENT NOTE
Acute Care - Physical Therapy Treatment Note  [facility]     Patient Name: Gabe Melendez  : 1934  MRN: [id number]  Today's Date: 10/19/2020           PT Assessment (last 12 hours)      PT Evaluation and Treatment     Row Name 10/19/20 1350          Physical Therapy Time and Intention    Subjective Information  complains of;pain  -TB     Document Type  therapy note (daily note)  -TB     Mode of Treatment  physical therapy  -TB     Row Name 10/19/20 1350          Vision Assessment/Intervention    Visual Impairment/Limitations  (S) legally blind  -TB     Row Name 10/19/20 1350          Motor Skills    Therapeutic Exercise  ankle;knee;hip;other (see comments) 1x15  -TB     Row Name 10/19/20 1350          Hip (Therapeutic Exercise)    Hip (Therapeutic Exercise)  AAROM (active assistive range of motion)  -TB     Hip AAROM (Therapeutic Exercise)  right;flexion;aBduction;aDduction;external rotation;internal rotation;supine  -TB     Row Name 10/19/20 1350          Knee (Therapeutic Exercise)    Knee (Therapeutic Exercise)  AAROM (active assistive range of motion)  -TB     Knee AAROM (Therapeutic Exercise)  right;flexion;extension;supine  -TB     Row Name 10/19/20 1350          Ankle (Therapeutic Exercise)    Ankle (Therapeutic Exercise)  AAROM (active assistive range of motion)  -TB     Ankle AAROM (Therapeutic Exercise)  bilateral;dorsiflexion;plantarflexion;supine  -TB     Row Name             Wound 10/17/20 1327 parietal region Laceration    Wound - Properties Group Placement Date: 10/17/20  -LF Placement Time: 1327  -LF Present on Hospital Admission: Y  -LF Location: parietal region  -LF Primary Wound Type: Laceration  -LF    Retired Wound - Properties Group Date first assessed: 10/17/20  -LF Time first assessed: 1327  -LF Present on Hospital Admission: Y  -LF Location: parietal region  -LF Primary Wound Type: Laceration  -LF    Row Name             Wound 10/17/20 1331 Left scalp Laceration    Wound - Properties  Group Placement Date: 10/17/20  -LF Placement Time: 1331  -LF Present on Hospital Admission: Y  -LF Side: Left  -LF Location: scalp  -LF Primary Wound Type: Laceration  -LF    Retired Wound - Properties Group Date first assessed: 10/17/20  -LF Time first assessed: 1331  -LF Present on Hospital Admission: Y  -LF Side: Left  -LF Location: scalp  -LF Primary Wound Type: Laceration  -LF    Row Name             Wound 10/18/20 0957 Left anterior hip Incision    Wound - Properties Group Placement Date: 10/18/20  -HT Placement Time: 0957 -HT Side: Left  -HT Orientation: anterior  -HT Location: hip  -HT Primary Wound Type: Incision  -HT    Retired Wound - Properties Group Date first assessed: 10/18/20  -HT Time first assessed: 0957 -HT Side: Left  -HT Location: hip  -HT Primary Wound Type: Incision  -HT    Row Name             Wound 10/18/20 0948 Left lower arm Skin Tear    Wound - Properties Group Placement Date: 10/18/20  -HT Placement Time: 0948  -HT Side: Left  -HT Orientation: lower  -HT Location: arm  -HT Primary Wound Type: Skin tear  -HT, PRESENT ON ARRIVAL TO OR     Retired Wound - Properties Group Date first assessed: 10/18/20  -HT Time first assessed: 0948  -HT Side: Left  -HT Location: arm  -HT Primary Wound Type: Skin tear  -HT, PRESENT ON ARRIVAL TO OR     Row Name 10/19/20 1350          Positioning and Restraints    Pre-Treatment Position  in bed  -TB     Post Treatment Position  bed  -TB     In Bed  fowlers;call light within reach;encouraged to call for assist;exit alarm on;with family/caregiver;side rails up x2;SCD pump applied;legs elevated;heels elevated  -TB       User Key  (r) = Recorded By, (t) = Taken By, (c) = Cosigned By    Initials Name Provider Type    HT Marie Chatman, RN Registered Nurse    Dafne Palomares PTA Physical Therapy Assistant    Suzan Hastings RN Registered Nurse        Physical Therapy Education                 Title: PT OT SLP Therapies (In Progress)     Topic:  Physical Therapy (In Progress)     Point: Mobility training (Done)     Learning Progress Summary           Patient Acceptance, E, VU,NR by  at 10/19/2020 1200    Comment: Pt educated on PT's role in POC and anterior hip precautions.                   Point: Home exercise program (Not Started)     Learner Progress:  Not documented in this visit.          Point: Body mechanics (Not Started)     Learner Progress:  Not documented in this visit.          Point: Precautions (Done)     Learning Progress Summary           Patient Acceptance, E, VU,NR by  at 10/19/2020 1200    Comment: Pt educated on PT's role in POC and anterior hip precautions.                               User Key     Initials Effective Dates Name Provider Type Discipline     09/02/20 -  Bernice Sosa, PT Student PT Student PT              PT Recommendation and Plan             Time Calculation:   PT Charges     Row Name 10/19/20 1050             Time Calculation    Start Time  1050 with 5 minute chart review  -MS (r) CC (t) MS (c)      Stop Time  1110  -MS (r) CC (t) MS (c)      Time Calculation (min)  20 min  -MS (r) CC (t)      PT Received On  10/19/20  -MS (r) CC (t) MS (c)      PT Goal Re-Cert Due Date  10/29/20  -MS (r) CC (t) MS (c)        User Key  (r) = Recorded By, (t) = Taken By, (c) = Cosigned By    Initials Name Provider Type    Janeth Belle, PT, DPT, NCS Physical Therapist     Bernice Sosa, PT Student PT Student            PT G-Codes  Outcome Measure Options: AM-PAC 6 Clicks Daily Activity (OT)  AM-PAC 6 Clicks Score (PT): 10  AM-PAC 6 Clicks Score (OT): 13    Dafne Agudelo, KEYANA  10/19/2020

## 2020-10-19 NOTE — CONSULTS
"Palliative Medicine Consult    Attending Physician: Diogenes   Consulting Physician:Diogenes  Reason for Consult: GOC \"I think wife has all of this in order; please confirm\"    CC: doing OK    History:  Gabe Melendez is a 86 y.o. male who presents today for evaluation of the above problems.    He is supine in bed and accompanied in the room by his daughter.  He was hospitalized and underwent surgery for a fractured left hip.  He has tolerated the procedure well and has not required opioid pain medications since.    He does have underlying dementia and his daughter notes she has not been able to see him much since Eric since he has been in nursing facilities and under Covid restrictions.  As such, she feels that his mental status is overall about the same and he has ability to recall  distant events, but is less likely to remember more recent issues.    He first noted he was not in pain, but on exam, he did endorse abdominal pain on palpation.  He then indicated that he was in fact having pain.    ROS:  Review of Systems   Unable to perform ROS: Dementia (ROS limited due to dementia)   Respiratory: Negative for cough and shortness of breath.    Cardiovascular: Negative for chest pain and palpitations.   Gastrointestinal: Positive for abdominal pain.   Musculoskeletal: Positive for arthralgias.       Allergies   Allergen Reactions   • Tetracyclines & Related Nausea Only     Unknown?     Past Medical History:   Diagnosis Date   • Arthropathy, unspecified    • Blind in both eyes    • Dementia (CMS/HCC)    • Glaucoma    • Testicular hypofunction      Past Surgical History:   Procedure Laterality Date   • HIP BIPOLAR REPLACEMENT Left 10/18/2020    Procedure: HIP BIPOLAR ANTERIOR;  Surgeon: Juwan Hernandez MD;  Location: [facility];  Service: Orthopedics;  Laterality: Left;     History reviewed. No pertinent family history.   reports that he has never smoked. He has never used smokeless tobacco. He reports previous alcohol " use. He reports previous drug use.      Current Facility-Administered Medications:   •  acetaminophen (TYLENOL) tablet 650 mg, 650 mg, Oral, Q4H PRN **OR** acetaminophen (TYLENOL) suppository 650 mg, 650 mg, Rectal, Q4H PRN, Juwan Hernandez MD, 650 mg at 10/19/20 1628  •  apixaban (ELIQUIS) tablet 2.5 mg, 2.5 mg, Oral, Q12H, Juwan Hernandez MD, 2.5 mg at 10/19/20 0958  •  bisacodyl (DULCOLAX) EC tablet 10 mg, 10 mg, Oral, Daily PRN, Juwan Hernandez MD  •  bisacodyl (DULCOLAX) suppository 10 mg, 10 mg, Rectal, Daily PRN, Juwan Hernandez MD, 10 mg at 10/19/20 0958  •  divalproex (DEPAKOTE) DR tablet 125 mg, 125 mg, Oral, QAM, Juwan Hernandez MD, 125 mg at 10/19/20 0959  •  divalproex (DEPAKOTE) DR tablet 250 mg, 250 mg, Oral, Nightly, Juwan Hernandez MD, 250 mg at 10/18/20 2144  •  docusate sodium (COLACE) capsule 100 mg, 100 mg, Oral, BID, Juwan Hernandez MD, 100 mg at 10/19/20 0959  •  HYDROcodone-acetaminophen (NORCO) 5-325 MG per tablet 1 tablet, 1 tablet, Oral, Q4H PRN, Juwan Hernandez MD, 1 tablet at 10/17/20 1320  •  HYDROcodone-acetaminophen (NORCO) 5-325 MG per tablet 2 tablet, 2 tablet, Oral, Q4H PRN, Juwan Hernandez MD  •  HYDROmorphone (DILAUDID) injection 0.5 mg, 0.5 mg, Intravenous, Q2H PRN **AND** [DISCONTINUED] naloxone (NARCAN) injection 0.1 mg, 0.1 mg, Intravenous, Q5 Min PRN, Juwan Hernandez MD  •  latanoprost (XALATAN) 0.005 % ophthalmic solution 1 drop, 1 drop, Both Eyes, Nightly, Juwan Hernandez MD, 1 drop at 10/17/20 2147  •  magnesium hydroxide (MILK OF MAGNESIA) suspension 2400 mg/10mL 10 mL, 10 mL, Oral, Daily PRN, Juwan Hernandez MD  •  morphine injection 4 mg, 4 mg, Intravenous, Q2H PRN **AND** naloxone (NARCAN) injection 0.4 mg, 0.4 mg, Intravenous, Q5 Min PRN, Juwan Hernandez MD  •  ondansetron (ZOFRAN) tablet 4 mg, 4 mg, Oral, Q6H PRN **OR** ondansetron (ZOFRAN) injection 4 mg, 4 mg, Intravenous, Q6H PRN, Juwan Hernandez MD  •  polyethylene glycol (MIRALAX)  "packet 17 g, 17 g, Oral, Daily PRN, Juwan Hernandez MD  •  sennosides (SENOKOT) 8.8 MG/5ML syrup 10 mL, 10 mL, Oral, Nightly, Jose Soto, DO  •  sodium chloride 0.9 % flush 10 mL, 10 mL, Intravenous, Q12H, Juwan Hernandez MD, 10 mL at 10/18/20 2148  •  sodium chloride 0.9 % flush 10 mL, 10 mL, Intravenous, PRN, Juwan Hernandez MD  •  sodium chloride 0.9 % flush 10 mL, 10 mL, Intravenous, PRN, Juwan Hernandez MD  •  sodium chloride 0.9 % flush 3 mL, 3 mL, Intravenous, Q12H, Juwan Hernandez MD, 3 mL at 10/18/20 2148  •  sodium chloride 0.9 % infusion, 100 mL/hr, Intravenous, Continuous, Juwan Hernandez MD, Last Rate: 100 mL/hr at 10/18/20 2144, 100 mL/hr at 10/18/20 2144    OBJECTIVE:  /83 (BP Location: Left arm, Patient Position: Lying)   Pulse 110   Temp 100.1 °F (37.8 °C) (Oral)   Resp 16   Ht 172.7 cm (68\")   Wt 69.9 kg (154 lb)   SpO2 93%   BMI 23.42 kg/m²    Physical Exam  Constitutional:       General: He is not in acute distress.     Appearance: Normal appearance.   HENT:      Head: Normocephalic and atraumatic.      Right Ear: External ear normal.      Left Ear: External ear normal.      Nose: Nose normal.      Mouth/Throat:      Mouth: Mucous membranes are dry.   Eyes:      General: No scleral icterus.     Extraocular Movements: Extraocular movements intact.   Neck:      Musculoskeletal: Normal range of motion and neck supple.   Cardiovascular:      Rate and Rhythm: Normal rate and regular rhythm.      Heart sounds: Normal heart sounds. No murmur.   Pulmonary:      Effort: Pulmonary effort is normal. No respiratory distress.      Breath sounds: Normal breath sounds. No wheezing.   Abdominal:      General: Abdomen is flat. Bowel sounds are normal. There is no distension.      Palpations: Abdomen is soft.      Tenderness: There is abdominal tenderness. There is guarding.   Musculoskeletal: Normal range of motion.      Right lower leg: No edema.      Left lower leg: No edema. "   Skin:     General: Skin is warm and dry.   Neurological:      General: No focal deficit present.      Mental Status: He is alert. He is disoriented.   Psychiatric:         Mood and Affect: Mood normal.         Behavior: Behavior normal.         Pertinent labs and imaging reviewed including, but not limited to the following:    Assessment    1. Left hip fracture s/p hemiarthroplasty  2. Dementia--unspecified type  3. Blindness  4. Osteoporosis    PPS  30%    Symptoms    1. Abdominal pain  2. Left hip pain  3. Debility    Plan    1.  Discussed with patient, though he does not have decision-making capacity.  His daughter indicates that he would not want any life-saving intervention and this has also been confirmed on discussions with his wife.  His CODE STATUS is noted as no CPR and limited interventions that would not include heroic measures.  In fact, his wife indicates he would want to be kept comfortable in the event of worsening.  Outside of his recent surgical intervention, I believe this would be most in line with his wishes.    2.  He has used only Tylenol for pain and has not used the opioid prescribed for him postsurgically.  He does have abdominal pain, which owing to his fracture, decreased mobility, anesthesia, and opioids could represent constipation.  I have ordered senna to combat this.  If his pain persists, consider imaging.      Thank you for this consult. Please contact me with any questions.         Jose Soto D.O. 10/19/2020

## 2020-10-19 NOTE — PLAN OF CARE
Goal Outcome Evaluation:  Plan of Care Reviewed With: patient  Progress: no change  Outcome Summary: Pt rests between care, will arouse but eye remain closed. Directions given prior to touching pt since he is blind and easily startled. Incontinent urine. Turn Q2, no no skin breakdown. Head wound dried blood, LARA. Hip drsg CDI.

## 2020-10-20 NOTE — PLAN OF CARE
"Patient was somewhat confused and disoriented.He was unable to answer complex questions. He was nonsensical when asked about his family and where he was at this time. He stated he was hungry and did not eat breakfast when asked how he was doing. Staff were close by and reported he ate all of his breakfast except for a small amount. He wanted a sip of water and said it was the \"best water\". He said he is in no pain and not hurting anywhere. He stated he \"just wants to sleep\". No family at bedside. Will continue to follow.     Sarah Rodriguezr, ALBERT  10/20/20  "

## 2020-10-20 NOTE — PLAN OF CARE
Goal Outcome Evaluation:  Plan of Care Reviewed With: patient, family  Progress: no change  Outcome Summary: Pt is confused. Can become agitated easily when asked to work with staff. Drsng changed due to getting soiled by urine. Voids frequently. Suppository given with little results. Refuse tele. Low grade temp treated with tylenol. Call light within reach. Safety maintained. Will cont to monitor.

## 2020-10-20 NOTE — DISCHARGE SUMMARY
"Patient ID: Gabe Melendez  MRN: [id number]     Acct:  [id number]    Admit Date: 10/17/2020   Discharge Date: 10/20/2020  Date of service: 10/20/2020    Consults:    IP CONSULT TO CASE MANAGEMENT   IP CONSULT TO ORTHOPEDIC SURGERY  IP CONSULT TO CASE MANAGEMENT   IP CONSULT TO PALLIATIVE CARE NURSE  IP CONSULT TO CASE MANAGEMENT     PATIENT PROFILE: The patient is a 87 y/o white  male resident of [facility] nursing (just recently moving here from [facility] that closed); he was cooperative.      CHIEF COMPLAINT: \"he broke his hip\"     HISTORY OF PRESENT ILLNESS: This gentleman has a dementia.  The exact type is not clarified.  He is also legally blind.  He occasionally is mildly agitated; primarily this means standing and trying to walk without assistance.  He fell and struck his head at his nursing home the morning of admission and was found to have a laceration of the posterior scalp.  He was sent to [facility] where they stapled that and performed a CT of his head that showed nothing acute.  He had no neck pain.  He was however found to have a left hip fracture.  I arranged transfer to this facility and have talked to Dr. Hernandez for orthopedic consultation.  The wife is been made aware.  She says he is otherwise well; never having had cancer heart or lung disease.    Allergies   Allergen Reactions   • Tetracyclines & Related Nausea Only       Unknown?         HOME MEDICATIONS:          Prior to Admission medications    Medication Sig Start Date End Date Taking? Authorizing Provider   acetaminophen (TYLENOL) 325 MG tablet Take 650 mg by mouth Every 4 (Four) Hours As Needed for Fever or Pain. Not to exceed 3 gm in 24 hr       Anthony Connolly MD   aluminum-magnesium hydroxide-simethicone (MAALOX/MYLANTA) 200-200-20 MG/5ML suspension Take 30 mL by mouth Every 4 (Four) Hours As Needed for Indigestion.       Anthony Connolly MD "   bisacodyl (DULCOLAX) 10 MG suppository Insert 10 mg into the rectum Daily As Needed for Constipation. If no bm from MOM       Anthony Connolly MD   divalproex (DEPAKOTE) 125 MG DR tablet Take 125 mg by mouth Every Morning.       Anthony Connolly MD   divalproex (DEPAKOTE) 125 MG DR tablet Take 2 tablets by mouth Every Night.       Anthony Connolly MD   donepezil (ARICEPT) 10 MG tablet Take 10 mg by mouth Every Night.       Anthony Connolly MD   ketoconazole (NIZORAL) 2 % shampoo Apply  topically to the appropriate area as directed Every 12 (Twelve) Hours As Needed for Dry Skin. To scalp for dry scalp       Anthony Cnonolly MD   magnesium hydroxide (MILK OF MAGNESIA) 400 MG/5ML suspension Take 30 mL by mouth Daily As Needed for Constipation. If no bm in 3 days       Anthony Connolly MD   potassium chloride (MICRO-K) 10 MEQ CR capsule Take 2 capsules by mouth Daily.       Anthony Connolly MD   Prenatal Vit-Fe Fumarate-FA (M-VIT PO) Take 1 tablet by mouth Daily.       Anthony Connolly MD   travoprost, BAK free, (TRAVATAN) 0.004 % solution ophthalmic solution Administer 1 drop to both eyes Every Evening.       Anthony Connolly MD     PAST HISTORY:  CHILDHOOD: unremarkable.      PROCEDURES:  Prostate exam/     SURGERIES:  None on record     FAMILY HISTORY:  HTN/  Heart/  DM/  CA-colon/  CA-prostate/  CA-breast/  CA-other/     HABITS:  Tobacco-smoker/  Tobacco-2nd handed/  Alcohol/  Drugs/     SOCIAL HISTORY:  /+  /  /  Employment/  Retired/  Disability/+  Children/     HOSPITAL ADMITS:   :   None in [facility]:   none     Margie:   No care everwhere noted     Review of Systems  Unable; unable to answer questions with understanding or reliability     PHYSICAL EXAMINATION:  /80 (BP Location: Right arm, Patient Position: Lying)   Pulse 118   Temp 98 °F (36.7 °C) (Temporal)   Resp 16   SpO2 93%      Physical Exam  GENERAL:  Well  nourished/developed in no acute distress. Thin  SKIN: Turgor excellent, without wound, rash, lesion.  HEENT: Normal cephalic without trauma.  Pupils equal round reactive to light. Extraocular motions full without nystagmus.     External canals nonobstructive nontender without reddness.  Oral cavity without growths, exudates, and moist.  Posterior pharynx without mass, obstruction, redness.  No thyromegaly, mass, tenderness, lymphadenopathy and supple.  CV: Regular rhythm.  No murmur, gallop,  edema. Posterior pulses intact.  No carotid bruits.   CHEST: No chest wall tenderness or mass.   LUNGS: Symmetric motion with clear to auscultation.    ABD: Soft, nontender without mass.   ORTHO: Symmetric extremities without swelling/point tenderness except L hip; LLE externally rotated.  Full gross range of motion except L hip  NEURO: LLE deferred: CN 2-12 grossly intact.  Symmetric facies. 1/4 x bicep equal reflexes.  UE/LE   2/5 strength throughout.  Nonfocal use extremities. Speech clear/mumbles   PSYCH: Disoriented x 3.  Pleasant calm, well kept.  Non-purposeful mumbling      ASSESSMENT/PROBLEM LIST:   85 y/o white male-advanced age   Allergy/intolerance: see above  Procedural history: to develop  Family history: to develop  Dementia  Agitation-depokate treated  glaucoma  Blindness  Gait difficulty-chronic  constipation  Nursing home resident     REASON FOR ADMISSION:    L hip fracture  L hip pain  Acute gait decline  Perioperative status  Incomplete data base    HOSPITAL COURSE: He remained medically stable and his echocardiogram looked good.  He was therefore taken to surgery by Dr. Hernandez for his hip fracture.  Postoperatively there were no significant issues; he remained confused with difficulties in ambulation (but this was close to his baseline).  I did get to meet his wife; in conversation with me and later conversations with palliative care and  she firmly established if she wanted compassionate care,  comfort care and no aggression.  He is eating and drinking reasonably; he is a long way to go if he is gone become ambulatory and believing the nursing home can provide his care at this point will returning him to [facility].  Orthopedics wanted him on postop anticoagulation and at this point despite the contusion he had of his head he seems to have no neurologic decline; we will use this drug and watch carefully.    Labs included:     Labs 24 hr before discharge:  Lab Results (last 24 hours)     Procedure Component Value Units Date/Time    Basic Metabolic Panel [[id number]]  (Abnormal) Collected: 10/20/20 0447    Specimen: Blood Updated: 10/20/20 0528     Glucose 131 mg/dL      BUN 22 mg/dL      Creatinine 1.00 mg/dL      Sodium 143 mmol/L      Potassium 3.4 mmol/L      Chloride 111 mmol/L      CO2 22.0 mmol/L      Calcium 9.4 mg/dL      eGFR Non African Amer 71 mL/min/1.73      BUN/Creatinine Ratio 22.0     Anion Gap 10.0 mmol/L     Narrative:      GFR Normal >60  Chronic Kidney Disease <60  Kidney Failure <15      CBC & Differential [[id number]]  (Abnormal) Collected: 10/20/20 0447    Specimen: Blood Updated: 10/20/20 0519    Narrative:      The following orders were created for panel order CBC & Differential.  Procedure                               Abnormality         Status                     ---------                               -----------         ------                     CBC Auto Differential[[id number]]        Abnormal            Final result                 Please view results for these tests on the individual orders.    CBC Auto Differential [[id number]]  (Abnormal) Collected: 10/20/20 0447    Specimen: Blood Updated: 10/20/20 0519     WBC 11.80 10*3/mm3      RBC 4.03 10*6/mm3      Hemoglobin 13.0 g/dL      Hematocrit 38.6 %      MCV 95.8 fL      MCH 32.3 pg      MCHC 33.7 g/dL      RDW 13.6 %      RDW-SD 48.0 fl      MPV 10.6 fL      Platelets 185 10*3/mm3      Neutrophil % 74.7 %       Lymphocyte % 10.6 %      Monocyte % 12.4 %      Eosinophil % 1.5 %      Basophil % 0.2 %      Immature Grans % 0.6 %      Neutrophils, Absolute 8.82 10*3/mm3      Lymphocytes, Absolute 1.25 10*3/mm3      Monocytes, Absolute 1.46 10*3/mm3      Eosinophils, Absolute 0.18 10*3/mm3      Basophils, Absolute 0.02 10*3/mm3      Immature Grans, Absolute 0.07 10*3/mm3      nRBC 0.0 /100 WBC           Lab Results otherwise  CBC:   Results from last 7 days   Lab Units 10/20/20  0447 10/19/20  0606 10/18/20  0434 10/17/20  1149   WBC 10*3/mm3 11.80* 12.23* 13.86* 16.32*   HEMOGLOBIN g/dL 13.0 11.9* 13.8 14.6   HEMATOCRIT % 38.6 35.5* 41.7 43.9   PLATELETS 10*3/mm3 185 159 189 167     BMP:  Results from last 7 days   Lab Units 10/20/20  0447 10/19/20  0606 10/18/20  0434 10/17/20  1149   SODIUM mmol/L 143 138 137 134*   POTASSIUM mmol/L 3.4* 3.5 4.2 4.1   CHLORIDE mmol/L 111* 110* 106 104   CO2 mmol/L 22.0 19.0* 23.0 19.0*   BUN mg/dL 22 19 15 18   CREATININE mg/dL 1.00 1.10 0.95 0.84   GLUCOSE mg/dL 131* 108* 120* 123*   CALCIUM mg/dL 9.4 9.2 9.4 9.4   ALT (SGPT) U/L  --   --   --  19       Culture Results: None    DISCHARGE ASSESSMENT:  Reasons for admit/problems address while here:   L hip fracture  L hip pain  L hip surgery incision  Acute gait decline  Perioperative status  Head contusion-recent  Scalp laceration-recent  Anticoagulation needs; some risk with head contusion-eliquis cautiously    Chronic problems affecting stay:  See above      PLAN:   See AVS    Discharge Disposition:  [facility]    Discharge Medications:     Discharge Medications      New Medications      Instructions Start Date   apixaban 2.5 MG tablet tablet  Commonly known as: ELIQUIS   2.5 mg, Oral, Every 12 Hours Scheduled      docusate sodium 100 MG capsule  Commonly known as: Colace   100 mg, Oral, 2 Times Daily      docusate sodium 100 MG capsule   100 mg, Oral, 2 Times Daily      HYDROcodone-acetaminophen 5-325 MG per  tablet  Commonly known as: NORCO   1 tablet, Oral, Every 4 Hours PRN      ondansetron 4 MG tablet  Commonly known as: Zofran   4 mg, Oral, Every 8 Hours PRN         Continue These Medications      Instructions Start Date   acetaminophen 325 MG tablet  Commonly known as: TYLENOL   650 mg, Oral, Every 4 Hours PRN, Not to exceed 3 gm in 24 hr      aluminum-magnesium hydroxide-simethicone 200-200-20 MG/5ML suspension  Commonly known as: MAALOX/MYLANTA   30 mL, Oral, Every 4 Hours PRN      bisacodyl 10 MG suppository  Commonly known as: DULCOLAX   10 mg, Rectal, Daily PRN, If no bm from MOM      Divalproex Sodium 125 MG capsule  Commonly known as: DEPAKOTE SPRINKLE   125 mg, Oral, Every Morning      Divalproex Sodium 125 MG capsule  Commonly known as: DEPAKOTE SPRINKLE   250 mg, Oral, Nightly      ketoconazole 2 % shampoo  Commonly known as: NIZORAL   1 application, Topical, Every 12 Hours PRN, To scalp for dry scalp      magnesium hydroxide 400 MG/5ML suspension  Commonly known as: MILK OF MAGNESIA   30 mL, Oral, Daily PRN, If no bm in 3 days      multivitamin with minerals tablet tablet   1 tablet, Oral, Daily      potassium chloride 10 MEQ CR capsule  Commonly known as: MICRO-K   2 capsules, Oral, Daily      travoprost (DEDE free) 0.004 % solution ophthalmic solution  Commonly known as: TRAVATAN   1 drop, Both Eyes, Every Evening             Discharge Care Plan/Instructions:   Re-admit to [facility]  Routine NH admit orders/procedures  NH to re/establish advance directive issues (confirm all of these with POA)  A. CPR status-was No CPR at hospital  B. Cardioversion status-was NO cardioversion at hospital  C. Intubation status-was NO intubation at hospital  D. Pressors status-was NO pressors at hospital  E. Dialysis status-was NO dialysis at hospital  F. Feeding tube status-was NO feeding tube at hospital  G. Power of /guardian-was wife at hospital    Rx:   Oxygen; prn   Also: if change in  condition maintain oxygen saturation > 90%  See AVS/discharge summary   Narcotic/controlled Rx were sent directly to NH pharmacy    Diet:   General  Consistency:    Solids: general     Fluids: thin  Calories 6743-0620/24 hr (supplement if needed)  Fluids 60 oz/24 hr  Dietary referral for nutritional assessment, advise and follow (please ask them to review within 48 hr)    Activity:   NOTE: patient is a high fall risk  Gradually increase as able  PT, OT, speech referrals   Up with assistance only until cleared by PT   Scalp carol ann out 10.24.20  See additional instructions below from orthopedics    Additional instructions:  LAB CBC, BMP weekly for one month  Wound/incision care see below    F/u appointments:   Dr Shetty will see on next regular NH rounds (call between if needs to be seen earlier)  Dr Burgess below          Lower Extremity Post-op Instructions  Dr. BURGESS      POST-OP CARE: Please follow these instructions closely!    IMPORTANT PHONE NUMBERS:  • For emergencies, please call 911  • You may reach Dr. Burgess or his medical assistants at [phone], M-F 8:0am-5:00pm  • After 5pm or on the weekends, please call the answering service which can be reached from the number above   • Call immediately if you have any of the following symptoms:  - Elevated temperature above 101.5 degrees for more than 48 hours after surgery  - Persistent drainage  from wound  - Severe pain around surgical site  - Calf pain    Weight Bearing:   __X___ Weight Bearing as tolerated (with or without crutches)    Bathing:  DO NOT SOAK the dressing or incision in water.  Pat the dressing and incision site dry after showering  _X__ Keep your dressing in place until follow-up and you may shower on the 3rd day following surgery; please cover your dressing and incision thoroughly    Dressings: Do NOT remove dressing/splint unless unless told to do so. SOME DRAINAGE IS NORMAL!  • If you have a splint or cast, do NOT get wet!!    • DO NOT touch,  remove, or apply ointment to the incision and/or steri strips  • Steri strips may fall off on their own  • Signs of infection that warrant a phone call to our clinical line:  o Excessive drainage or redness  o Red streaking coming away from the incision  o Increased pain  o Increased temperature above 101 degrees    Sutures:  If your physician uses sutures in your knee or ankle, they will dissolve on their own and will not need to be removed.  Black sutures occasionally used will need to be removed 10-14 days after surgery.    Elevate: Place 2 pillows under your ankle to get the incision area above the level of the heart to help in swelling (a recliner is not elevated!!!)    Ice: Ice your surgery site 5-6 times per day for 20 minutes at a time with dressing in place. You should wait at least 30 minutes before icing again to avoid ice irritation. It may be difficult at first to ice the surgery area due to the amount of dressing, but continue to be diligent with icing.  Your dressings will be taken down at your first post-op appointment.     Range of motion:   - For the knee- It is important to gain gain full extension (knee straight) as soon as possible following surgery.         Ice to decrease swelling --> Knee fully straight --> Walk without a limp!!!  - NO PILLOWS UNDER THE KNEE, ONLY under the ankle  - For the foot/ankle- range of motion restrictions will be given to you at your first post-op appointment. Until that time, avoid any unnecessary range o f motion.  - Physical therapy- Your physical therapy status will be discussed with you at your first post-op appointment.   **Achieving range of motion goals and decreasing swelling/inflammation are the primary focus for the first two (2) weeks following surgery. **    Medications: You will be discharged with the appropriate medications following your surgery. Fill these at the pharmacy and take them as directed on the label.   Possible medications that will be  prescribed are below.  You may or may not receive all of these. Occasionally, additional medications may be given with specific instructions.  Percocet/Lortab (oxycodone/hydrocodone with tylenol) - Pain Medication.  o Take one tablet every 4-6 hours. DO NOT EXCEED 4,000mg of Tylenol in 24 hours.        **Itching is not an allergy - take benadryl or an over the counter allergy medication (claritin, Zyrtec) if needed  **DO NOT MIX WITH ALCOHOL, DRIVE WHILE TAKING, OR TAKE EXTRA TYLENOL*   Zofran - Anti-nausea medication to help prevent nausea and vomiting after surgery.     Eliquis 2.5 mg - all patients with lower extremity surgery should take one 2.5 mg tablet every 12 hours (twice daily) for 42 days after surgery    DO NOT TAKE NSAID'S (ex. IBUPROFEN, MOTRIN, ALEVE, ETC) AFTER A BROKEN BONE HAS BEEN REPAIRED OR AFTER ACL SURGERY - THESE MEDICATIONS WILL SLOW THE HEALING PROCESS!!!    **If you are running low on pain medications, please notify us if you need a refill 24-48 hours prior to when you run out, so we can make arrangements to refill the prescription for you if we determine it is necessary**    Other appointments:   None    CONDITION: stable    PROGNOSIS: guarded    TIME: 40 minutes spend reviewing diagnosis lists, Rx lists, labs, radiology reports; talking/messaging nursing, talking with patient/family, and ordering Rx, labs, diet and making other decisions for care plan.

## 2020-10-20 NOTE — PROGRESS NOTES
Patient ID: Gabe Melendez  MRN: [id number]     Acct:  [id number]  Admit Date: 10/17/2020   Date of service: 10/19/2020     LOS: 2 days   Patient Care Team:  Ba Shetty MD as PCP - General (Family Medicine)    Chief Complaint:  Broken hip    Subjective  He felt [facility] or nursing; he had a laceration to the occipital scalp.  He was sent to [facility] thinking he needed a suture.  There they found he had a left hip fracture.  He was transferred here for surgical intervention.  Surgery was done 10.18.20 without obvious issues.  He has a significant dementia.    Interval History: With pain medications left on and off.  No obvious distress noted.    .  Current Facility-Administered Medications:   •  acetaminophen (TYLENOL) tablet 650 mg, 650 mg, Oral, Q4H PRN **OR** acetaminophen (TYLENOL) suppository 650 mg, 650 mg, Rectal, Q4H PRN, Juwan Hernandez MD, 650 mg at 10/19/20 1628  •  apixaban (ELIQUIS) tablet 2.5 mg, 2.5 mg, Oral, Q12H, Juwan Hernandez MD, 2.5 mg at 10/19/20 0958  •  bisacodyl (DULCOLAX) EC tablet 10 mg, 10 mg, Oral, Daily PRN, Juwan Hernandez MD  •  bisacodyl (DULCOLAX) suppository 10 mg, 10 mg, Rectal, Daily PRN, Juwan Hernandez MD, 10 mg at 10/19/20 0958  •  divalproex (DEPAKOTE) DR tablet 125 mg, 125 mg, Oral, QAM, Juwan Hernandez MD, 125 mg at 10/19/20 0959  •  divalproex (DEPAKOTE) DR tablet 250 mg, 250 mg, Oral, Nightly, Juwan Hernandez MD, 250 mg at 10/18/20 2144  •  docusate sodium (COLACE) capsule 100 mg, 100 mg, Oral, BID, Juwan Hernandez MD, 100 mg at 10/19/20 0959  •  HYDROcodone-acetaminophen (NORCO) 5-325 MG per tablet 1 tablet, 1 tablet, Oral, Q4H PRN, Juwan Hernandez MD, 1 tablet at 10/17/20 1320  •  HYDROcodone-acetaminophen (NORCO) 5-325 MG per tablet 2 tablet, 2 tablet, Oral, Q4H PRN, Juwan Hernandez MD  •  HYDROmorphone (DILAUDID) injection 0.5 mg, 0.5 mg, Intravenous, Q2H PRN **AND** [DISCONTINUED] naloxone (NARCAN) injection 0.1 mg, 0.1 mg,  "Intravenous, Q5 Min PRN, Juwan Hernandez MD  •  latanoprost (XALATAN) 0.005 % ophthalmic solution 1 drop, 1 drop, Both Eyes, Nightly, Juwan Hernandez MD, 1 drop at 10/17/20 2147  •  magnesium hydroxide (MILK OF MAGNESIA) suspension 2400 mg/10mL 10 mL, 10 mL, Oral, Daily PRN, Juwan Hernandez MD  •  morphine injection 4 mg, 4 mg, Intravenous, Q2H PRN **AND** naloxone (NARCAN) injection 0.4 mg, 0.4 mg, Intravenous, Q5 Min PRN, Juwan Hernandez MD  •  ondansetron (ZOFRAN) tablet 4 mg, 4 mg, Oral, Q6H PRN **OR** ondansetron (ZOFRAN) injection 4 mg, 4 mg, Intravenous, Q6H PRN, Juwan Hernandez MD  •  polyethylene glycol (MIRALAX) packet 17 g, 17 g, Oral, Daily PRN, Juwan Hernandez MD  •  sennosides (SENOKOT) 8.8 MG/5ML syrup 10 mL, 10 mL, Oral, Nightly, Jose Soto, DO  •  sodium chloride 0.9 % flush 10 mL, 10 mL, Intravenous, Q12H, Juwan Hernandez MD, 10 mL at 10/18/20 2148  •  sodium chloride 0.9 % flush 10 mL, 10 mL, Intravenous, PRN, Juwan Hernandez MD  •  sodium chloride 0.9 % flush 10 mL, 10 mL, Intravenous, PRN, Juwan Hernandez MD  •  sodium chloride 0.9 % flush 3 mL, 3 mL, Intravenous, Q12H, Juwan Hernandez MD, 3 mL at 10/18/20 2148  •  sodium chloride 0.9 % infusion, 100 mL/hr, Intravenous, Continuous, Juwan Hernandez MD, Last Rate: 100 mL/hr at 10/18/20 2144, 100 mL/hr at 10/18/20 2144    Review of Systems:   Unable to perform due to significant memory deficits.    Objective     Vital Signs  /83 (BP Location: Left arm, Patient Position: Lying)   Pulse 110   Temp 100.1 °F (37.8 °C) (Oral)   Resp 16   Ht 172.7 cm (68\")   Wt 69.9 kg (154 lb)   SpO2 93%   BMI 23.42 kg/m²   Temp:  [98 °F (36.7 °C)-100.1 °F (37.8 °C)] 100.1 °F (37.8 °C)  Heart Rate:  [] 110  Resp:  [16] 16  BP: (101-158)/(52-89) 158/83      Intake/Output Summary (Last 24 hours) at 10/19/2020 2000  Last data filed at 10/19/2020 1230  Gross per 24 hour   Intake 220 ml   Output --   Net 220 ml       Lab " Results:  Lab Results (last 24 hours)     Procedure Component Value Units Date/Time    Basic Metabolic Panel [[id number]]  (Abnormal) Collected: 10/19/20 0606    Specimen: Blood Updated: 10/19/20 0654     Glucose 108 mg/dL      BUN 19 mg/dL      Creatinine 1.10 mg/dL      Sodium 138 mmol/L      Potassium 3.5 mmol/L      Chloride 110 mmol/L      CO2 19.0 mmol/L      Calcium 9.2 mg/dL      eGFR Non African Amer 63 mL/min/1.73      BUN/Creatinine Ratio 17.3     Anion Gap 9.0 mmol/L     Narrative:      GFR Normal >60  Chronic Kidney Disease <60  Kidney Failure <15      CBC & Differential [[id number]]  (Abnormal) Collected: 10/19/20 0606    Specimen: Blood Updated: 10/19/20 0634    Narrative:      The following orders were created for panel order CBC & Differential.  Procedure                               Abnormality         Status                     ---------                               -----------         ------                     CBC Auto Differential[[id number]]        Abnormal            Final result                 Please view results for these tests on the individual orders.    CBC Auto Differential [[id number]]  (Abnormal) Collected: 10/19/20 0606    Specimen: Blood Updated: 10/19/20 0634     WBC 12.23 10*3/mm3      RBC 3.69 10*6/mm3      Hemoglobin 11.9 g/dL      Hematocrit 35.5 %      MCV 96.2 fL      MCH 32.2 pg      MCHC 33.5 g/dL      RDW 13.6 %      RDW-SD 47.8 fl      MPV 10.8 fL      Platelets 159 10*3/mm3      Neutrophil % 79.8 %      Lymphocyte % 6.5 %      Monocyte % 12.8 %      Eosinophil % 0.0 %      Basophil % 0.2 %      Immature Grans % 0.7 %      Neutrophils, Absolute 9.75 10*3/mm3      Lymphocytes, Absolute 0.80 10*3/mm3      Monocytes, Absolute 1.57 10*3/mm3      Eosinophils, Absolute 0.00 10*3/mm3      Basophils, Absolute 0.02 10*3/mm3      Immature Grans, Absolute 0.09 10*3/mm3      nRBC 0.0 /100 WBC           CBC:   Results from last 7 days   Lab Units 10/19/20  0606 10/18/20  0437  10/17/20  1149   WBC 10*3/mm3 12.23* 13.86* 16.32*   HEMOGLOBIN g/dL 11.9* 13.8 14.6   HEMATOCRIT % 35.5* 41.7 43.9   PLATELETS 10*3/mm3 159 189 167     BMP:   Results from last 7 days   Lab Units 10/19/20  0606 10/18/20  0434 10/17/20  1149   SODIUM mmol/L 138 137 134*   POTASSIUM mmol/L 3.5 4.2 4.1   CHLORIDE mmol/L 110* 106 104   CO2 mmol/L 19.0* 23.0 19.0*   BUN mg/dL 19 15 18   CREATININE mg/dL 1.10 0.95 0.84   EGFR IF NONAFRICN AM mL/min/1.73 63 75 87   GLUCOSE mg/dL 108* 120* 123*   CALCIUM mg/dL 9.2 9.4 9.4   ALT (SGPT) U/L  --   --  19     INR:       Culture Results: None    Radiology: No new planned.     Additional Studies Reviewed:   Results for orders placed during the hospital encounter of 10/17/20   Adult Transthoracic Echo Complete W/ Cont if Necessary Per Protocol    Narrative · Left ventricular systolic function is normal. Left ventricular ejection   fraction appears to be 56 - 60%.  · Left ventricular diastolic function is consistent with (grade I)   impaired relaxation.  · Normal right ventricular cavity size and systolic function noted.  · No hemodynamically significant valvular abnormalities identified on this   study.        Xr Hip With Or Without Pelvis 2 - 3 View Left    Result Date: 10/18/2020  EXAMINATION: XR HIP W OR WO PELVIS 2-3 VIEW LEFT- 10/18/2020 12:07 PM CDT  HISTORY: bipolar anterior; S72.002A-Fracture of unspecified part of neck of left femur, initial encounter for closed fracture; R13.10-Dysphagia, unspecified.  Fluoroscopy time: 4 seconds.  Radiation dose: 0.9 mGy  Number fluoroscopic images acquired: 3.  REPORT: 3 intraoperative fluoroscopic spot views were obtained under the supervision of the orthopedic surgery service, demonstrating left hip arthroplasty with satisfactory position of the prosthesis and normal alignment. The images are stored in PACS for review. This report was finalized on 10/18/2020 12:08 by Dr. Piyush Smiley MD.    Xr Hip With Or Without Pelvis 2 - 3  View Left    Physical Exam:  GENERAL:  AFA/developed in no acute distress. Thin, frail.  SKIN: Turgor ok, without wound, rash, lesion.  HEENT: Normal cephalic without trauma.  Pupils equal round reactive to light. Extraocular motions full without nystagmus.     External canals nonobstructive nontender without reddness.  Oral cavity without growths, exudates, and moist. No thyromegaly, mass, tenderness, lymphadenopathy and supple.  CV: Regular rhythm.  No murmur, gallop,  edema. Posterior pulses intact.    CHEST: No chest wall tenderness or mass.   LUNGS: Symmetric motion with clear to auscultation.    ABD: Soft, nontender without mass.   ORTHO: Symmetric extremities without swelling/point tenderness except L hip; Full gross range of motion except L hip  NEURO: LLE deferred: CN 2-12 grossly intact.  Symmetric facies. 1/4 x bicep equal reflexes.  UE/LE   2/5 strength throughout.  Nonfocal use extremities. Speech clear/mumbles   PSYCH: Disoriented x 3.  Pleasant calm, well kept.  Non-purposeful mumbling     Results Review:  above    ASSESSMENT/PLAN:  Reason for admit/problems addressing acutely:  L hip fracture  L hip pain  L hip surgery incision  Acute gait decline  Perioperative status  Head contusion  Scalp laceration  Anticoagulation needs; some risk with head contusion    Chronic problems to review/consider during care:  87 y/o white male-advanced age   Allergy/intolerance: see above  Procedural history: to develop  Family history: to develop  Dementia  Agitation-depokate treated  glaucoma  Blindness  History back surgery  Gait difficulty-chronic  constipation  Nursing home resident  Osteoporosis-hip fracture    Rx-reviewed, considered with changes as needed: same  Labs reviewed, considered and changes made as needed: same  Imaging reviewed, and need for considered: none planned  Consults  Consults     Date and Time Order Name Status Description    10/17/2020 6244 Inpatient Orthopedic Surgery Consult Completed          Using this admission and surgery as opportunities define advanced directive and goals  Diet reviewed, considered and changes made as needed: same/encouraged  Fluids reviewed, considered and changes made as needed: same/encouraged + IV  Increase activity: as able  Code status:   Code Status and Medical Interventions:   Ordered at: 10/17/20 5529     Limited Support to NOT Include:    Artificial Nutrition    Intubation    Antiarrhythmic Drugs    Antibiotics    Blood Products    NIPPV (Non-Invasive Positive Pressure Support)    Vasopressors    Cardioversion/Defibrillation    Dialysis     Code Status:    No CPR     Medical Interventions (Level of Support Prior to Arrest):    Limited     Discussed possible/future discharge plans: Family okay with going back to [facility] or nursing  Case discussed with staff.  Available to talk if needed with  POA/caregiver and members care team.    Ba Shetty MD  10/19/20  20:00 CDT

## 2020-10-20 NOTE — NURSING NOTE
Bedside handoff report given to NICANOR Low, pt is oriented to person only, alert, speech illogical at times. No changes noted

## 2020-10-20 NOTE — DISCHARGE INSTRUCTIONS
Re-admit to [facility]  Routine NH admit orders/procedures  NH to re/establish advance directive issues (confirm all of these with POA)  A. CPR status-was No CPR at hospital  B. Cardioversion status-was NO cardioversion at hospital  C. Intubation status-was NO intubation at hospital  D. Pressors status-was NO pressors at hospital  E. Dialysis status-was NO dialysis at hospital  F. Feeding tube status-was NO feeding tube at hospital  G. Power of /guardian-was wife at hospital    Rx:   Oxygen; prn   Also: if change in condition maintain oxygen saturation > 90%  See AVS/discharge summary   Narcotic/controlled Rx were sent directly to NH pharmacy    Diet:   General  Consistency:    Solids: general     Fluids: thin  Calories 8788-5434/24 hr (supplement if needed)  Fluids 60 oz/24 hr  Dietary referral for nutritional assessment, advise and follow (please ask them to review within 48 hr)    Activity:   NOTE: patient is a high fall risk  Gradually increase as able  PT, OT, speech referrals   Up with assistance only until cleared by PT   Scalp carol ann out 10.24.20  See additional instructions below from orthopedics    Additional instructions:  LAB CBC, BMP weekly for one month  Wound/incision care see below    F/u appointments:   Dr Shetty will see on next regular NH rounds (call between if needs to be seen earlier)  Dr Burgess below          Lower Extremity Post-op Instructions  Dr. BURGESS      POST-OP CARE: Please follow these instructions closely!    IMPORTANT PHONE NUMBERS:  • For emergencies, please call 911  • You may reach Dr. Burgess or his medical assistants at [phone], M-F 8:0am-5:00pm  • After 5pm or on the weekends, please call the answering service which can be reached from the number above   • Call immediately if you have any of the following symptoms:  - Elevated temperature above 101.5 degrees for more than 48 hours after surgery  - Persistent drainage  from wound  - Severe pain around  surgical site  - Calf pain    Weight Bearing:   __X___ Weight Bearing as tolerated (with or without crutches)    Bathing:  DO NOT SOAK the dressing or incision in water.  Pat the dressing and incision site dry after showering  _X__ Keep your dressing in place until follow-up and you may shower on the 3rd day following surgery; please cover your dressing and incision thoroughly    Dressings: Do NOT remove dressing/splint unless unless told to do so. SOME DRAINAGE IS NORMAL!  • If you have a splint or cast, do NOT get wet!!    • DO NOT touch, remove, or apply ointment to the incision and/or steri strips  • Steri strips may fall off on their own  • Signs of infection that warrant a phone call to our clinical line:  o Excessive drainage or redness  o Red streaking coming away from the incision  o Increased pain  o Increased temperature above 101 degrees    Sutures:  If your physician uses sutures in your knee or ankle, they will dissolve on their own and will not need to be removed.  Black sutures occasionally used will need to be removed 10-14 days after surgery.    Elevate: Place 2 pillows under your ankle to get the incision area above the level of the heart to help in swelling (a recliner is not elevated!!!)    Ice: Ice your surgery site 5-6 times per day for 20 minutes at a time with dressing in place. You should wait at least 30 minutes before icing again to avoid ice irritation. It may be difficult at first to ice the surgery area due to the amount of dressing, but continue to be diligent with icing.  Your dressings will be taken down at your first post-op appointment.     Range of motion:   - For the knee- It is important to gain gain full extension (knee straight) as soon as possible following surgery.         Ice to decrease swelling --> Knee fully straight --> Walk without a limp!!!  - NO PILLOWS UNDER THE KNEE, ONLY under the ankle  - For the foot/ankle- range of motion restrictions will be given to you at  your first post-op appointment. Until that time, avoid any unnecessary range o f motion.  - Physical therapy- Your physical therapy status will be discussed with you at your first post-op appointment.   **Achieving range of motion goals and decreasing swelling/inflammation are the primary focus for the first two (2) weeks following surgery. **    Medications: You will be discharged with the appropriate medications following your surgery. Fill these at the pharmacy and take them as directed on the label.   Possible medications that will be prescribed are below.  You may or may not receive all of these. Occasionally, additional medications may be given with specific instructions.  Percocet/Lortab (oxycodone/hydrocodone with tylenol) - Pain Medication.  o Take one tablet every 4-6 hours. DO NOT EXCEED 4,000mg of Tylenol in 24 hours.        **Itching is not an allergy - take benadryl or an over the counter allergy medication (claritin, Zyrtec) if needed  **DO NOT MIX WITH ALCOHOL, DRIVE WHILE TAKING, OR TAKE EXTRA TYLENOL*   Zofran - Anti-nausea medication to help prevent nausea and vomiting after surgery.     Eliquis 2.5 mg - all patients with lower extremity surgery should take one 2.5 mg tablet every 12 hours (twice daily) for 42 days after surgery    DO NOT TAKE NSAID'S (ex. IBUPROFEN, MOTRIN, ALEVE, ETC) AFTER A BROKEN BONE HAS BEEN REPAIRED OR AFTER ACL SURGERY - THESE MEDICATIONS WILL SLOW THE HEALING PROCESS!!!    **If you are running low on pain medications, please notify us if you need a refill 24-48 hours prior to when you run out, so we can make arrangements to refill the prescription for you if we determine it is necessary**

## 2020-10-20 NOTE — THERAPY TREATMENT NOTE
Acute Care - Physical Therapy Treatment Note  [facility]     Patient Name: Gabe Melendez  : 1934  MRN: [id number]  Today's Date: 10/20/2020           PT Assessment (last 12 hours)      PT Evaluation and Treatment     [facility] Name 10/20/20 0925          Physical Therapy Time and Intention    Subjective Information  complains of;pain  -     Document Type  therapy note (daily note)  -     Mode of Treatment  physical therapy  -     Row Name 10/20/20 0925          General Information    Existing Precautions/Restrictions  fall;left;hip, anterior legallly blind  -     Barriers to Rehab  medically complex;visual deficit  -[facility] Name 10/20/20 0925          Vision Assessment/Intervention    Visual Impairment/Limitations  (S) legally blind  -[facility] Name 10/20/20 0925          Pain    Additional Documentation  Pain Scale: FACES Pre/Post-Treatment (Group)  -[facility] Name 10/20/20 0925          Pain Scale: Word Pre/Post-Treatment    Pain Intervention(s)  Repositioned  -[facility] Name 10/20/20 0925          Pain Scale: FACES Pre/Post-Treatment    Pain: FACES Scale, Pretreatment  0-->no hurt at rest  -     Posttreatment Pain Rating  4-->hurts little more with mobility  -     Pain Location - Side  Left  -     Pain Location  hip  -[facility] Name 10/20/20 0925          Bed Mobility    Supine-Sit Gray (Bed Mobility)  maximum assist (25% patient effort);2 person assist;verbal cues  -     Sit-Supine Gray (Bed Mobility)  maximum assist (25% patient effort);2 person assist;verbal cues  -     Comment (Bed Mobility)  sat EOB 15 minutes min-cga to maintain sitting balance  -[facility] Name 10/20/20 0925          Transfers    Transfers  sit-stand transfer;stand-sit transfer  -     Comment (Transfers)  stood x 2  -     Sit-Stand Gray (Transfers)  moderate assist (50% patient effort);maximum assist (25% patient effort);2 person assist;verbal cues  -     Stand-Sit Gray  (Transfers)  minimum assist (75% patient effort);moderate assist (50% patient effort);verbal cues;2 person assist  -     Row Name 10/20/20 0925          Knee (Therapeutic Exercise)    Knee (Therapeutic Exercise)  PROM (passive range of motion)  -     Knee PROM (Therapeutic Exercise)  -- LAQ  -     Row Name             Wound 10/17/20 1327 parietal region Laceration    Wound - Properties Group Placement Date: 10/17/20  -LF Placement Time: 1327  -LF Present on Hospital Admission: Y  -LF Location: parietal region  -LF Primary Wound Type: Laceration  -LF    Retired Wound - Properties Group Date first assessed: 10/17/20  -LF Time first assessed: 1327  -LF Present on Hospital Admission: Y  -LF Location: parietal region  -LF Primary Wound Type: Laceration  -LF    Row Name             Wound 10/17/20 1331 Left scalp Laceration    Wound - Properties Group Placement Date: 10/17/20  -LF Placement Time: 1331  -LF Present on Hospital Admission: Y  -LF Side: Left  -LF Location: scalp  -LF Primary Wound Type: Laceration  -LF    Retired Wound - Properties Group Date first assessed: 10/17/20  -LF Time first assessed: 1331  -LF Present on Hospital Admission: Y  -LF Side: Left  -LF Location: scalp  -LF Primary Wound Type: Laceration  -LF    Row Name             Wound 10/18/20 0957 Left anterior hip Incision    Wound - Properties Group Placement Date: 10/18/20  -HT Placement Time: 0957 -HT Side: Left  -HT Orientation: anterior  -HT Location: hip  -HT Primary Wound Type: Incision  -HT    Retired Wound - Properties Group Date first assessed: 10/18/20  -HT Time first assessed: 0957  -HT Side: Left  -HT Location: hip  -HT Primary Wound Type: Incision  -HT    Row Name             Wound 10/18/20 0948 Left lower arm Skin Tear    Wound - Properties Group Placement Date: 10/18/20  -HT Placement Time: 0948  -HT Side: Left  -HT Orientation: lower  -HT Location: arm  -HT Primary Wound Type: Skin tear  -HT, PRESENT ON ARRIVAL TO OR     Retired  Wound - Properties Group Date first assessed: 10/18/20  -HT Time first assessed: 0948  -HT Side: Left  -HT Location: arm  -HT Primary Wound Type: Skin tear  -HT, PRESENT ON ARRIVAL TO OR     Row Name 10/20/20 0925          Plan of Care Review    Plan of Care Reviewed With  patient  -     Progress  no change  -     Outcome Summary  Pt trans to EOB max of 2, sat EOB 15 minutes min-cga to maintain sitting balance, PROM LAQ BLE, sit-stand mod-max of 2, pt stood x 2, trans back to bed max of 2, pt would benefit from SNF  -     Row Name 10/20/20 0925          Positioning and Restraints    Pre-Treatment Position  in bed  -     Post Treatment Position  bed  -     In Bed  fowlers;call light within reach;encouraged to call for assist;exit alarm on;SCD pump applied  -       User Key  (r) = Recorded By, (t) = Taken By, (c) = Cosigned By    Initials Name Provider Type     Amy Khan PTA Physical Therapy Assistant     Marie Chatman RN Registered Nurse     Suzan Ziegler RN Registered Nurse        Physical Therapy Education                 Title: PT OT SLP Therapies (In Progress)     Topic: Physical Therapy (In Progress)     Point: Mobility training (Done)     Learning Progress Summary           Patient Acceptance, E,TB, VU by  at 10/20/2020 0950    Comment: BED MOBILITY    Acceptance, E, VU,NR by  at 10/19/2020 1200    Comment: Pt educated on PT's role in POC and anterior hip precautions.                   Point: Home exercise program (Not Started)     Learner Progress:  Not documented in this visit.          Point: Body mechanics (Not Started)     Learner Progress:  Not documented in this visit.          Point: Precautions (Done)     Learning Progress Summary           Patient Acceptance, E, VU,NR by  at 10/19/2020 1200    Comment: Pt educated on PT's role in POC and anterior hip precautions.                               User Key     Initials Effective Dates Name Provider Type Discipline      08/02/16 -  Amy Khan PTA Physical Therapy Assistant PT    CC 09/02/20 -  Bernice Sosa, PT Student PT Student PT              PT Recommendation and Plan     Plan of Care Reviewed With: patient  Progress: no change  Outcome Summary: Pt trans to EOB max of 2, sat EOB 15 minutes min-cga to maintain sitting balance, PROM LAQ BLE, sit-stand mod-max of 2, pt stood x 2, trans back to bed max of 2, pt would benefit from SNF       Time Calculation:   PT Charges     Row Name 10/20/20 0950             Time Calculation    Start Time  0925  -      Stop Time  0948  -      Time Calculation (min)  23 min  -      PT Received On  10/20/20  -         Time Calculation- PT    Total Timed Code Minutes- PT  23 minute(s)  -         Timed Charges    24619 - PT Therapeutic Activity Minutes  23  -        User Key  (r) = Recorded By, (t) = Taken By, (c) = Cosigned By    Initials Name Provider Type     Amy Khan PTA Physical Therapy Assistant        Therapy Charges for Today     Code Description Service Date Service Provider Modifiers Qty    [id number]  PT THERAPEUTIC ACT EA 15 MIN 10/20/2020 Amy Khan PTA GP 2          PT G-Codes  Outcome Measure Options: AM-PAC 6 Clicks Daily Activity (OT)  AM-PAC 6 Clicks Score (PT): 10  AM-PAC 6 Clicks Score (OT): 13    Amy Khan PTA  10/20/2020

## 2020-10-20 NOTE — PROGRESS NOTES
Discharge Planning Assessment   [facility]     Patient Name: Gabe Melendez  MRN: [id number]  Today's Date: 10/20/2020    Admit Date: 10/17/2020    Discharge Needs Assessment     Row Name 10/20/20 1303       Living Environment    Lives With  facility resident    Name(s) of Who Lives With Patient  Pt is from [facility]    Current Living Arrangements  extended care facility    Primary Care Provided by  other (see comments)    Provides Primary Care For  no one    Family Caregiver if Needed  other (see comments) SNF    Quality of Family Relationships  involved;helpful    Able to Return to Prior Arrangements  yes    Living Arrangement Comments  Plan is for return to [facility]       Resource/Environmental Concerns    Resource/Environmental Concerns  none    Transportation Concerns  car, none       Transition Planning    Patient/Family Anticipates Transition to  long-term care facility    Patient/Family Anticipated Services at Transition  skilled nursing    Transportation Anticipated  health plan transportation       Discharge Needs Assessment    Readmission Within the Last 30 Days  no previous admission in last 30 days    Current Outpatient/Agency/Support Group  skilled nursing facility    Equipment Currently Used at Home  other (see comments) SNF provides dme    Concerns to be Addressed  denies needs/concerns at this time    Anticipated Changes Related to Illness  none    Equipment Needed After Discharge  other (see comments) SNF provides dme    Outpatient/Agency/Support Group Needs  skilled nursing facility    Discharge Facility/Level of Care Needs  nursing facility, skilled    Discharge Coordination/Progress  Pt is from [facility] and has a bed hold. Pt will return to [facility] ([phone])        Discharge Plan    No documentation.       Continued Care and Services - Admitted Since 10/17/2020     Destination Coordination complete    Service Provider Request Status Selected Services  Address Phone Fax    [facility] [place]   Selected Skilled Nursing [address] [address] [phone]              Expected Discharge Date and Time     Expected Discharge Date Expected Discharge Time    Oct 20, 2020         Demographic Summary    No documentation.       Functional Status    No documentation.       Psychosocial    No documentation.       Abuse/Neglect    No documentation.       Legal    No documentation.       Substance Abuse    No documentation.       Patient Forms    No documentation.           Shruthi Hill MSW

## 2020-10-20 NOTE — PLAN OF CARE
Problem: Adult Inpatient Plan of Care  Goal: Plan of Care Review  Recent Flowsheet Documentation  Taken 10/20/2020 0925 by Amy Khan, PTA  Progress: no change  Plan of Care Reviewed With: patient  Outcome Summary: Pt trans to EOB max of 2, sat EOB 15 minutes min-cga to maintain sitting balance, PROM LAQ BLE, sit-stand mod-max of 2, pt stood x 2, trans back to bed max of 2, pt would benefit from SNF

## 2020-10-20 NOTE — PROGRESS NOTES
Continued Stay Note   [facility]     Patient Name: Gabe Melendez  MRN: [id number]  Today's Date: 10/20/2020    Admit Date: 10/17/2020    Discharge Plan     Row Name 10/20/20 1316       Plan    Final Discharge Disposition Code  03 - skilled nursing facility (SNF)    Final Note  Pt is being dcd back to [facility] today, skilled level. NH notified. Faxed DC Summary and orders. Call report number is [phone]        Discharge Codes    No documentation.       Expected Discharge Date and Time     Expected Discharge Date Expected Discharge Time    Oct 20, 2020             ALBERT Singh

## 2020-10-20 NOTE — DISCHARGE PLACEMENT REQUEST
"Gabe Melendez (86 y.o. Male)     Date of Birth Social Security Number Address Home Phone MRN    1934  [address] [place] [address] [phone] [id number]    Faith Marital Status          Other        Admission Date Admission Type Admitting Provider Attending Provider Department, Room/Bed    10/17/20 Urgent Ba Shetty MD Oliver, Randy Eugene, MD [facility] 3A, 349/1    Discharge Date Discharge Disposition Discharge Destination         Skilled Nursing Facility (DC - External)              Attending Provider: Ba Shetty MD    Allergies: Tetracyclines & Related    Isolation: None   Infection: None   Code Status: No CPR    Ht: 172.7 cm (68\")   Wt: 69.9 kg (154 lb)    Admission Cmt: None   Principal Problem: Left displaced femoral neck fracture (CMS/HCC) [S72.002A] More...                 Active Insurance as of 10/17/2020     Primary Coverage     Payor Plan Insurance Group Employer/Plan Group    MEDICARE MEDICARE A & B      Payor Plan Address Payor Plan Phone Number Payor Plan Fax Number Effective Dates    [address] [phone]  9/1/1999 - None Entered    [facility] [id number]       Subscriber Name Subscriber Birth Date Member ID       GABE MELENDEZ 1934 [id number]           Secondary Coverage     Payor Plan Insurance Group Employer/Plan Group    [place]      Payor Plan Address Payor Plan Phone Number Payor Plan Fax Number Effective Dates    [address] [address] [phone]  1/1/2020 - None Entered    [facility] [address]       Subscriber Name Subscriber Birth Date Member ID       GABE MELENDEZ 1934 [id number]                 Emergency Contacts      (Rel.) Home Phone Work Phone Mobile Phone    BIN MELENDEZ (Spouse) [phone] -- --    Alicia Mercado (Daughter) -- -- [phone]    [employer]Fant[employer] (Son) -- -- [phone]                 Discharge Summary      Ba Shetty MD at 10/20/20 1256      " "    Patient ID: Gabe Melendez  MRN: [id number]     Acct:  [id number]    Admit Date: 10/17/2020   Discharge Date: 10/20/2020  Date of service: 10/20/2020    Consults:    IP CONSULT TO CASE MANAGEMENT   IP CONSULT TO ORTHOPEDIC SURGERY  IP CONSULT TO CASE MANAGEMENT   IP CONSULT TO PALLIATIVE CARE NURSE  IP CONSULT TO CASE MANAGEMENT     PATIENT PROFILE: The patient is a 85 y/o white  male resident of [facility] nursing (just recently moving here from [facility] that closed); he was cooperative.      CHIEF COMPLAINT: \"he broke his hip\"     HISTORY OF PRESENT ILLNESS: This gentleman has a dementia.  The exact type is not clarified.  He is also legally blind.  He occasionally is mildly agitated; primarily this means standing and trying to walk without assistance.  He fell and struck his head at his nursing home the morning of admission and was found to have a laceration of the posterior scalp.  He was sent to [facility] where they stapled that and performed a CT of his head that showed nothing acute.  He had no neck pain.  He was however found to have a left hip fracture.  I arranged transfer to this facility and have talked to Dr. Hernandez for orthopedic consultation.  The wife is been made aware.  She says he is otherwise well; never having had cancer heart or lung disease.    Allergies   Allergen Reactions   • Tetracyclines & Related Nausea Only       Unknown?         HOME MEDICATIONS:          Prior to Admission medications    Medication Sig Start Date End Date Taking? Authorizing Provider   acetaminophen (TYLENOL) 325 MG tablet Take 650 mg by mouth Every 4 (Four) Hours As Needed for Fever or Pain. Not to exceed 3 gm in 24 hr       Anthony Connolly MD   aluminum-magnesium hydroxide-simethicone (MAALOX/MYLANTA) 200-200-20 MG/5ML suspension Take 30 mL by mouth Every 4 (Four) Hours As Needed for Indigestion.       Anthony Connolly MD "   bisacodyl (DULCOLAX) 10 MG suppository Insert 10 mg into the rectum Daily As Needed for Constipation. If no bm from MOM       Anthony Connolly MD   divalproex (DEPAKOTE) 125 MG DR tablet Take 125 mg by mouth Every Morning.       Anthony Connolly MD   divalproex (DEPAKOTE) 125 MG DR tablet Take 2 tablets by mouth Every Night.       Anthony Connolly MD   donepezil (ARICEPT) 10 MG tablet Take 10 mg by mouth Every Night.       Anthony Connolly MD   ketoconazole (NIZORAL) 2 % shampoo Apply  topically to the appropriate area as directed Every 12 (Twelve) Hours As Needed for Dry Skin. To scalp for dry scalp       Anthony Connolly MD   magnesium hydroxide (MILK OF MAGNESIA) 400 MG/5ML suspension Take 30 mL by mouth Daily As Needed for Constipation. If no bm in 3 days       Anthony Connolly MD   potassium chloride (MICRO-K) 10 MEQ CR capsule Take 2 capsules by mouth Daily.       Anthony Connolly MD   Prenatal Vit-Fe Fumarate-FA (M-VIT PO) Take 1 tablet by mouth Daily.       Anthony Connolly MD   travoprost, BAK free, (TRAVATAN) 0.004 % solution ophthalmic solution Administer 1 drop to both eyes Every Evening.       Anthony Connolly MD     PAST HISTORY:  CHILDHOOD: unremarkable.      PROCEDURES:  Prostate exam/     SURGERIES:  None on record     FAMILY HISTORY:  HTN/  Heart/  DM/  CA-colon/  CA-prostate/  CA-breast/  CA-other/     HABITS:  Tobacco-smoker/  Tobacco-2nd handed/  Alcohol/  Drugs/     SOCIAL HISTORY:  /+  /  /  Employment/  Retired/  Disability/+  Children/     HOSPITAL ADMITS:   :   None in [facility]:   none     Margie:   No care everwhere noted     Review of Systems  Unable; unable to answer questions with understanding or reliability     PHYSICAL EXAMINATION:  /80 (BP Location: Right arm, Patient Position: Lying)   Pulse 118   Temp 98 °F (36.7 °C) (Temporal)   Resp 16   SpO2 93%      Physical Exam  GENERAL:  Well  nourished/developed in no acute distress. Thin  SKIN: Turgor excellent, without wound, rash, lesion.  HEENT: Normal cephalic without trauma.  Pupils equal round reactive to light. Extraocular motions full without nystagmus.     External canals nonobstructive nontender without reddness.  Oral cavity without growths, exudates, and moist.  Posterior pharynx without mass, obstruction, redness.  No thyromegaly, mass, tenderness, lymphadenopathy and supple.  CV: Regular rhythm.  No murmur, gallop,  edema. Posterior pulses intact.  No carotid bruits.   CHEST: No chest wall tenderness or mass.   LUNGS: Symmetric motion with clear to auscultation.    ABD: Soft, nontender without mass.   ORTHO: Symmetric extremities without swelling/point tenderness except L hip; LLE externally rotated.  Full gross range of motion except L hip  NEURO: LLE deferred: CN 2-12 grossly intact.  Symmetric facies. 1/4 x bicep equal reflexes.  UE/LE   2/5 strength throughout.  Nonfocal use extremities. Speech clear/mumbles   PSYCH: Disoriented x 3.  Pleasant calm, well kept.  Non-purposeful mumbling      ASSESSMENT/PROBLEM LIST:   87 y/o white male-advanced age   Allergy/intolerance: see above  Procedural history: to develop  Family history: to develop  Dementia  Agitation-depokate treated  glaucoma  Blindness  Gait difficulty-chronic  constipation  Nursing home resident     REASON FOR ADMISSION:    L hip fracture  L hip pain  Acute gait decline  Perioperative status  Incomplete data base    HOSPITAL COURSE: He remained medically stable and his echocardiogram looked good.  He was therefore taken to surgery by Dr. Hernandez for his hip fracture.  Postoperatively there were no significant issues; he remained confused with difficulties in ambulation (but this was close to his baseline).  I did get to meet his wife; in conversation with me and later conversations with palliative care and  she firmly established if she wanted compassionate care,  comfort care and no aggression.  He is eating and drinking reasonably; he is a long way to go if he is gone become ambulatory and believing the nursing home can provide his care at this point will returning him to [facility].  Orthopedics wanted him on postop anticoagulation and at this point despite the contusion he had of his head he seems to have no neurologic decline; we will use this drug and watch carefully.    Labs included:     Labs 24 hr before discharge:  Lab Results (last 24 hours)     Procedure Component Value Units Date/Time    Basic Metabolic Panel [[id number]]  (Abnormal) Collected: 10/20/20 0447    Specimen: Blood Updated: 10/20/20 0528     Glucose 131 mg/dL      BUN 22 mg/dL      Creatinine 1.00 mg/dL      Sodium 143 mmol/L      Potassium 3.4 mmol/L      Chloride 111 mmol/L      CO2 22.0 mmol/L      Calcium 9.4 mg/dL      eGFR Non African Amer 71 mL/min/1.73      BUN/Creatinine Ratio 22.0     Anion Gap 10.0 mmol/L     Narrative:      GFR Normal >60  Chronic Kidney Disease <60  Kidney Failure <15      CBC & Differential [[id number]]  (Abnormal) Collected: 10/20/20 0447    Specimen: Blood Updated: 10/20/20 0519    Narrative:      The following orders were created for panel order CBC & Differential.  Procedure                               Abnormality         Status                     ---------                               -----------         ------                     CBC Auto Differential[[id number]]        Abnormal            Final result                 Please view results for these tests on the individual orders.    CBC Auto Differential [[id number]]  (Abnormal) Collected: 10/20/20 0447    Specimen: Blood Updated: 10/20/20 0519     WBC 11.80 10*3/mm3      RBC 4.03 10*6/mm3      Hemoglobin 13.0 g/dL      Hematocrit 38.6 %      MCV 95.8 fL      MCH 32.3 pg      MCHC 33.7 g/dL      RDW 13.6 %      RDW-SD 48.0 fl      MPV 10.6 fL      Platelets 185 10*3/mm3      Neutrophil % 74.7 %       Lymphocyte % 10.6 %      Monocyte % 12.4 %      Eosinophil % 1.5 %      Basophil % 0.2 %      Immature Grans % 0.6 %      Neutrophils, Absolute 8.82 10*3/mm3      Lymphocytes, Absolute 1.25 10*3/mm3      Monocytes, Absolute 1.46 10*3/mm3      Eosinophils, Absolute 0.18 10*3/mm3      Basophils, Absolute 0.02 10*3/mm3      Immature Grans, Absolute 0.07 10*3/mm3      nRBC 0.0 /100 WBC           Lab Results otherwise  CBC:   Results from last 7 days   Lab Units 10/20/20  0447 10/19/20  0606 10/18/20  0434 10/17/20  1149   WBC 10*3/mm3 11.80* 12.23* 13.86* 16.32*   HEMOGLOBIN g/dL 13.0 11.9* 13.8 14.6   HEMATOCRIT % 38.6 35.5* 41.7 43.9   PLATELETS 10*3/mm3 185 159 189 167     BMP:  Results from last 7 days   Lab Units 10/20/20  0447 10/19/20  0606 10/18/20  0434 10/17/20  1149   SODIUM mmol/L 143 138 137 134*   POTASSIUM mmol/L 3.4* 3.5 4.2 4.1   CHLORIDE mmol/L 111* 110* 106 104   CO2 mmol/L 22.0 19.0* 23.0 19.0*   BUN mg/dL 22 19 15 18   CREATININE mg/dL 1.00 1.10 0.95 0.84   GLUCOSE mg/dL 131* 108* 120* 123*   CALCIUM mg/dL 9.4 9.2 9.4 9.4   ALT (SGPT) U/L  --   --   --  19       Culture Results: None    DISCHARGE ASSESSMENT:  Reasons for admit/problems address while here:   L hip fracture  L hip pain  L hip surgery incision  Acute gait decline  Perioperative status  Head contusion-recent  Scalp laceration-recent  Anticoagulation needs; some risk with head contusion-eliquis cautiously    Chronic problems affecting stay:  See above      PLAN:   See AVS    Discharge Disposition:  [facility]    Discharge Medications:     Discharge Medications      New Medications      Instructions Start Date   apixaban 2.5 MG tablet tablet  Commonly known as: ELIQUIS   2.5 mg, Oral, Every 12 Hours Scheduled      docusate sodium 100 MG capsule  Commonly known as: Colace   100 mg, Oral, 2 Times Daily      docusate sodium 100 MG capsule   100 mg, Oral, 2 Times Daily      HYDROcodone-acetaminophen 5-325 MG per  tablet  Commonly known as: NORCO   1 tablet, Oral, Every 4 Hours PRN      ondansetron 4 MG tablet  Commonly known as: Zofran   4 mg, Oral, Every 8 Hours PRN         Continue These Medications      Instructions Start Date   acetaminophen 325 MG tablet  Commonly known as: TYLENOL   650 mg, Oral, Every 4 Hours PRN, Not to exceed 3 gm in 24 hr      aluminum-magnesium hydroxide-simethicone 200-200-20 MG/5ML suspension  Commonly known as: MAALOX/MYLANTA   30 mL, Oral, Every 4 Hours PRN      bisacodyl 10 MG suppository  Commonly known as: DULCOLAX   10 mg, Rectal, Daily PRN, If no bm from MOM      Divalproex Sodium 125 MG capsule  Commonly known as: DEPAKOTE SPRINKLE   125 mg, Oral, Every Morning      Divalproex Sodium 125 MG capsule  Commonly known as: DEPAKOTE SPRINKLE   250 mg, Oral, Nightly      ketoconazole 2 % shampoo  Commonly known as: NIZORAL   1 application, Topical, Every 12 Hours PRN, To scalp for dry scalp      magnesium hydroxide 400 MG/5ML suspension  Commonly known as: MILK OF MAGNESIA   30 mL, Oral, Daily PRN, If no bm in 3 days      multivitamin with minerals tablet tablet   1 tablet, Oral, Daily      potassium chloride 10 MEQ CR capsule  Commonly known as: MICRO-K   2 capsules, Oral, Daily      travoprost (DEDE free) 0.004 % solution ophthalmic solution  Commonly known as: TRAVATAN   1 drop, Both Eyes, Every Evening             Discharge Care Plan/Instructions:   Re-admit to [facility]  Routine NH admit orders/procedures  NH to re/establish advance directive issues (confirm all of these with POA)  A. CPR status-was No CPR at hospital  B. Cardioversion status-was NO cardioversion at hospital  C. Intubation status-was NO intubation at hospital  D. Pressors status-was NO pressors at hospital  E. Dialysis status-was NO dialysis at hospital  F. Feeding tube status-was NO feeding tube at hospital  G. Power of /guardian-was wife at hospital    Rx:   Oxygen; prn   Also: if change in  condition maintain oxygen saturation > 90%  See AVS/discharge summary   Narcotic/controlled Rx were sent directly to NH pharmacy    Diet:   General  Consistency:    Solids: general     Fluids: thin  Calories 3616-7749/24 hr (supplement if needed)  Fluids 60 oz/24 hr  Dietary referral for nutritional assessment, advise and follow (please ask them to review within 48 hr)    Activity:   NOTE: patient is a high fall risk  Gradually increase as able  PT, OT, speech referrals   Up with assistance only until cleared by PT   Scalp carol ann out 10.24.20  See additional instructions below from orthopedics    Additional instructions:  LAB CBC, BMP weekly for one month  Wound/incision care see below    F/u appointments:   Dr Shetty will see on next regular NH rounds (call between if needs to be seen earlier)  Dr Burgess below          Lower Extremity Post-op Instructions  Dr. BURGESS      POST-OP CARE: Please follow these instructions closely!    IMPORTANT PHONE NUMBERS:  • For emergencies, please call 911  • You may reach Dr. Burgess or his medical assistants at [phone], M-F 8:0am-5:00pm  • After 5pm or on the weekends, please call the answering service which can be reached from the number above   • Call immediately if you have any of the following symptoms:  - Elevated temperature above 101.5 degrees for more than 48 hours after surgery  - Persistent drainage  from wound  - Severe pain around surgical site  - Calf pain    Weight Bearing:   __X___ Weight Bearing as tolerated (with or without crutches)    Bathing:  DO NOT SOAK the dressing or incision in water.  Pat the dressing and incision site dry after showering  _X__ Keep your dressing in place until follow-up and you may shower on the 3rd day following surgery; please cover your dressing and incision thoroughly    Dressings: Do NOT remove dressing/splint unless unless told to do so. SOME DRAINAGE IS NORMAL!  • If you have a splint or cast, do NOT get wet!!    • DO NOT touch,  remove, or apply ointment to the incision and/or steri strips  • Steri strips may fall off on their own  • Signs of infection that warrant a phone call to our clinical line:  o Excessive drainage or redness  o Red streaking coming away from the incision  o Increased pain  o Increased temperature above 101 degrees    Sutures:  If your physician uses sutures in your knee or ankle, they will dissolve on their own and will not need to be removed.  Black sutures occasionally used will need to be removed 10-14 days after surgery.    Elevate: Place 2 pillows under your ankle to get the incision area above the level of the heart to help in swelling (a recliner is not elevated!!!)    Ice: Ice your surgery site 5-6 times per day for 20 minutes at a time with dressing in place. You should wait at least 30 minutes before icing again to avoid ice irritation. It may be difficult at first to ice the surgery area due to the amount of dressing, but continue to be diligent with icing.  Your dressings will be taken down at your first post-op appointment.     Range of motion:   - For the knee- It is important to gain gain full extension (knee straight) as soon as possible following surgery.         Ice to decrease swelling --> Knee fully straight --> Walk without a limp!!!  - NO PILLOWS UNDER THE KNEE, ONLY under the ankle  - For the foot/ankle- range of motion restrictions will be given to you at your first post-op appointment. Until that time, avoid any unnecessary range o f motion.  - Physical therapy- Your physical therapy status will be discussed with you at your first post-op appointment.   **Achieving range of motion goals and decreasing swelling/inflammation are the primary focus for the first two (2) weeks following surgery. **    Medications: You will be discharged with the appropriate medications following your surgery. Fill these at the pharmacy and take them as directed on the label.   Possible medications that will be  prescribed are below.  You may or may not receive all of these. Occasionally, additional medications may be given with specific instructions.  Percocet/Lortab (oxycodone/hydrocodone with tylenol) - Pain Medication.  o Take one tablet every 4-6 hours. DO NOT EXCEED 4,000mg of Tylenol in 24 hours.        **Itching is not an allergy - take benadryl or an over the counter allergy medication (claritin, Zyrtec) if needed  **DO NOT MIX WITH ALCOHOL, DRIVE WHILE TAKING, OR TAKE EXTRA TYLENOL*   Zofran - Anti-nausea medication to help prevent nausea and vomiting after surgery.     Eliquis 2.5 mg - all patients with lower extremity surgery should take one 2.5 mg tablet every 12 hours (twice daily) for 42 days after surgery    DO NOT TAKE NSAID'S (ex. IBUPROFEN, MOTRIN, ALEVE, ETC) AFTER A BROKEN BONE HAS BEEN REPAIRED OR AFTER ACL SURGERY - THESE MEDICATIONS WILL SLOW THE HEALING PROCESS!!!    **If you are running low on pain medications, please notify us if you need a refill 24-48 hours prior to when you run out, so we can make arrangements to refill the prescription for you if we determine it is necessary**    Other appointments:   None    CONDITION: stable    PROGNOSIS: guarded    TIME: 40 minutes spend reviewing diagnosis lists, Rx lists, labs, radiology reports; talking/messaging nursing, talking with patient/family, and ordering Rx, labs, diet and making other decisions for care plan.      Electronically signed by Ba Shetty MD at 10/20/20 1306       Discharge Order (From admission, onward)     Start     Ordered    10/20/20 1246  Discharge patient  Once     Expected Discharge Date: 10/20/20    Discharge Disposition: Skilled Nursing Facility (DC - External)    Physician of Record for Attribution - Please select from Treatment Team: BA SHETTY [[id number]]    Review needed by CMO to determine Physician of Record: No    Please choose which facility the patient is currently admitted if they are being  discharged to another facility or unit.: [facility]    Interfacility: [facility]    Mode: Ambulance (medically necessary)       Question Answer Comment   Physician of Record for Attribution - Please select from Treatment Team LORI FIERRO    Review needed by CMO to determine Physician of Record No    Please choose which facility the patient is currently admitted if they are being discharged to another facility or unit. [facility]    [facility]    Mode: [facility] (medically necessary)        10/20/20 1242

## 2020-10-20 NOTE — PROGRESS NOTES
Orthopedic Surgery Progress Note    Gabe Melendez  10/20/2020      Subjective:     Systemic or Specific Complaints: Patient resting comfortably in bed this a.m.  No reports of any overnight issues.  Patient still demented to his baseline.    Objective:     Patient Vitals for the past 24 hrs:   BP Temp Temp src Pulse Resp SpO2   10/20/20 0421 141/98 99.1 °F (37.3 °C) Axillary 99 18 95 %   10/19/20 2300 141/83 99 °F (37.2 °C) Temporal 91 16 100 %   10/19/20 2100 133/98 98.6 °F (37 °C) Oral 88 18 100 %   10/19/20 1622 158/83 100.1 °F (37.8 °C) Oral 110 16 93 %   10/19/20 0747 137/89 -- -- 95 16 98 %       left lower  General: alert, appears stated age and cooperative   Wound: clean, dry, intact             Dressing: clean, dry, intact   Extremity: Distal NVI           DVT Exam: No evidence of DVT seen on physical exam.                   Data Review:  Lab Results (last 24 hours)     Procedure Component Value Units Date/Time    Basic Metabolic Panel [[id number]]  (Abnormal) Collected: 10/20/20 0447    Specimen: Blood Updated: 10/20/20 0528     Glucose 131 mg/dL      BUN 22 mg/dL      Creatinine 1.00 mg/dL      Sodium 143 mmol/L      Potassium 3.4 mmol/L      Chloride 111 mmol/L      CO2 22.0 mmol/L      Calcium 9.4 mg/dL      eGFR Non African Amer 71 mL/min/1.73      BUN/Creatinine Ratio 22.0     Anion Gap 10.0 mmol/L     Narrative:      GFR Normal >60  Chronic Kidney Disease <60  Kidney Failure <15      CBC & Differential [[id number]]  (Abnormal) Collected: 10/20/20 0447    Specimen: Blood Updated: 10/20/20 0519    Narrative:      The following orders were created for panel order CBC & Differential.  Procedure                               Abnormality         Status                     ---------                               -----------         ------                     CBC Auto Differential[[id number]]        Abnormal            Final result                 Please view results for these tests on the individual orders.     CBC Auto Differential [[id number]]  (Abnormal) Collected: 10/20/20 0447    Specimen: Blood Updated: 10/20/20 0519     WBC 11.80 10*3/mm3      RBC 4.03 10*6/mm3      Hemoglobin 13.0 g/dL      Hematocrit 38.6 %      MCV 95.8 fL      MCH 32.3 pg      MCHC 33.7 g/dL      RDW 13.6 %      RDW-SD 48.0 fl      MPV 10.6 fL      Platelets 185 10*3/mm3      Neutrophil % 74.7 %      Lymphocyte % 10.6 %      Monocyte % 12.4 %      Eosinophil % 1.5 %      Basophil % 0.2 %      Immature Grans % 0.6 %      Neutrophils, Absolute 8.82 10*3/mm3      Lymphocytes, Absolute 1.25 10*3/mm3      Monocytes, Absolute 1.46 10*3/mm3      Eosinophils, Absolute 0.18 10*3/mm3      Basophils, Absolute 0.02 10*3/mm3      Immature Grans, Absolute 0.07 10*3/mm3      nRBC 0.0 /100 WBC         Imaging Results (Last 24 Hours)     ** No results found for the last 24 hours. **          Assessment:     POD# 2 status post left hip anterior hip hemiarthroplasty secondary to a displaced left femoral neck fracture    Plan:      1:  DVT prophylaxis, ICE, elevate  2:  Pain control  3:  Physical therapy/Occupational therapy  4:  Anticipate discharge once placement arranged in a skilled nursing facility and if pain well controlled  5:  Weightbearing as tolerated with anterior hip precautions to the left lower extremity      Juwan Hernandez MD

## 2020-10-20 NOTE — PLAN OF CARE
Goal Outcome Evaluation:  Plan of Care Reviewed With: patient, family  Progress: no change   Pt is oriented to person only, able to communicate at times but some speech is illogical. Turned q2, tolerated meds whole with sauce, PPP In LLE, vss, safety maintained.

## 2020-10-21 NOTE — THERAPY DISCHARGE NOTE
Acute Care - Physical Therapy Discharge Summary  [facility]       Patient Name: Gabe Melendez  : 1934  MRN: [id number]    Today's Date: 10/21/2020                 Admit Date: 10/17/2020      PT Recommendation and Plan    Visit Dx:    ICD-10-CM ICD-9-CM   1. Left displaced femoral neck fracture (CMS/[facility])  S72.002A 820.8   2. Dysphagia, unspecified type  R13.10 787.20   3. Impaired functional mobility, balance, gait, and endurance  Z74.09 V49.89               Rehab Goal Summary     Row Name 10/21/20 0851 10/21/20 0800          Bed Mobility Goal 1 (PT)    Activity/Assistive Device (Bed Mobility Goal 1, PT)  bed mobility activities, all  -AB  --     [place] Level/Cues Needed (Bed Mobility Goal 1, PT)  minimum assist (75% or more patient effort);2 person assist  -AB  --     Time Frame (Bed Mobility Goal 1, PT)  long term goal (LTG);by discharge  -AB  --     Progress/Outcomes (Bed Mobility Goal 1, PT)  goal not met  -AB  --        Transfer Goal 1 (PT)    Activity/Assistive Device (Transfer Goal 1, PT)  sit-to-stand/stand-to-sit;bed-to-chair/chair-to-bed  -AB  --     [place] Level/Cues Needed (Transfer Goal 1, PT)  minimum assist (75% or more patient effort);2 person assist  -AB  --     Time Frame (Transfer Goal 1, PT)  long term goal (LTG);by discharge  -AB  --     Progress/Outcome (Transfer Goal 1, PT)  goal not met  -AB  --        Gait Training Goal 1 (PT)    Activity/Assistive Device (Gait Training Goal 1, PT)  gait (walking locomotion);walker, rolling  -AB  --     [place] Level (Gait Training Goal 1, PT)  minimum assist (75% or more patient effort)  -AB  --     Distance (Gait Training Goal 1, PT)  20'  -AB  --     Time Frame (Gait Training Goal 1, PT)  long term goal (LTG);by discharge  -AB  --     Progress/Outcome (Gait Training Goal 1, PT)  goal not met  -AB  --        Transfer Goal 1 (OT)    Activity/Assistive Device (Transfer Goal 1, OT)  --   sit-to-stand/stand-to-sit;bed-to-chair/chair-to-bed;toilet  -TS     [place] Level/Cues Needed (Transfer Goal 1, OT)  --  minimum assist (75% or more patient effort)  -TS     Time Frame (Transfer Goal 1, OT)  --  long term goal (LTG)  -TS     Progress/Outcome (Transfer Goal 1, OT)  --  goal not met  -TS        Dressing Goal 1 (OT)    Activity/Device (Dressing Goal 1, OT)  --  upper body dressing;lower body dressing  -TS     [place]/Cues Needed (Dressing Goal 1, OT)  --  minimum assist (75% or more patient effort);verbal cues required;tactile cues required  -TS     Time Frame (Dressing Goal 1, OT)  --  long term goal (LTG)  -TS     Progress/Outcome (Dressing Goal 1, OT)  --  goal not met  -TS        Toileting Goal 1 (OT)    Activity/Device (Toileting Goal 1, OT)  --  toileting skills, all;commode, bedside without drop arms;commode  -TS     [place] Level/Cues Needed (Toileting Goal 1, OT)  --  minimum assist (75% or more patient effort)  -TS     Time Frame (Toileting Goal 1, OT)  --  long term goal (LTG)  -TS     Progress/Outcome (Toileting Goal 1, OT)  --  goal not met  -TS       User Key  (r) = Recorded By, (t) = Taken By, (c) = Cosigned By    Initials Name Provider Type Discipline    AB Juanis Gill PTA Physical Therapy Assistant PT    TS Elisabet Cuevas, GOMEZ/L Occupational Therapy Assistant OT              PT Discharge Summary  Anticipated Discharge Disposition (PT): skilled nursing facility  Reason for Discharge: Discharge from facility  Outcomes Achieved: Refer to plan of care for updates on goals achieved  Discharge Destination: SNF      Juanis Gill PTA   10/21/2020

## 2020-10-21 NOTE — THERAPY DISCHARGE NOTE
Acute Care - Occupational Therapy Discharge Summary  [facility]     Patient Name: Gabe Melendez  : 1934  MRN: [id number]    Today's Date: 10/21/2020                 Admit Date: 10/17/2020        OT Recommendation and Plan    Visit Dx:    ICD-10-CM ICD-9-CM   1. Left displaced femoral neck fracture (CMS/[facility])  S72.002A 820.8   2. Dysphagia, unspecified type  R13.10 787.20   3. Impaired functional mobility, balance, gait, and endurance  Z74.09 V49.89               Rehab Goal Summary     Row Name 10/21/20 0800             Transfer Goal 1 (OT)    Activity/Assistive Device (Transfer Goal 1, OT)  sit-to-stand/stand-to-sit;bed-to-chair/chair-to-bed;toilet  -TS      [place] Level/Cues Needed (Transfer Goal 1, OT)  minimum assist (75% or more patient effort)  -TS      Time Frame (Transfer Goal 1, OT)  long term goal (LTG)  -TS      Progress/Outcome (Transfer Goal 1, OT)  goal not met  -TS         Dressing Goal 1 (OT)    Activity/Device (Dressing Goal 1, OT)  upper body dressing;lower body dressing  -TS      [place]/Cues Needed (Dressing Goal 1, OT)  minimum assist (75% or more patient effort);verbal cues required;tactile cues required  -TS      Time Frame (Dressing Goal 1, OT)  long term goal (LTG)  -TS      Progress/Outcome (Dressing Goal 1, OT)  goal not met  -TS         Toileting Goal 1 (OT)    Activity/Device (Toileting Goal 1, OT)  toileting skills, all;commode, bedside without drop arms;commode  -TS      [place] Level/Cues Needed (Toileting Goal 1, OT)  minimum assist (75% or more patient effort)  -TS      Time Frame (Toileting Goal 1, OT)  long term goal (LTG)  -TS      Progress/Outcome (Toileting Goal 1, OT)  goal not met  -TS        User Key  (r) = Recorded By, (t) = Taken By, (c) = Cosigned By    Initials Name Provider Type Discipline    TS Elisabet Cuevas, GOMEZ/L Occupational Therapy Assistant OT                      OT Discharge Summary  Anticipated Discharge Disposition (OT): skilled  nursing facility  Reason for Discharge: Discharge from facility  Outcomes Achieved: Refer to plan of care for updates on goals achieved  Discharge Destination: SNF      KIMMIE Dallas  10/21/2020

## 2020-10-30 NOTE — PROGRESS NOTES
Nursing Facility Progress Note      Paul Ramirez, APRN  [address] [address]  Phone: [phone]  Fax: [phone]      PATIENT NAME: Gabe Melendez                                                                          YOB: 1934            DATE OF SERVICE: 10/28/2020  FACILITY: [facility]     CHIEF COMPLAINT:  Regular nursing facility visit      HISTORY OF PRESENT ILLNESS:   This 86 y.o. male was seen at the nursing facility today for routine rounding review of acute and chronic problems.      This gentleman is pleasant.  He is getting skilled rehab after breaking his left hip.  He is status post hip replacement.  He was admitted to the skilled nursing facility on 9/17/2020.  He is legally blind, has a history of dementia.  He takes Depakote as a mood stabilizer.  Denies any unmanaged pain or intolerance to therapy today.    PAST MEDICAL & SURGICAL HISTORY:   Past Medical History:   Diagnosis Date   • Arthropathy, unspecified    • Blind in both eyes    • Dementia (CMS/HCC)    • Glaucoma    • Testicular hypofunction       Past Surgical History:   Procedure Laterality Date   • HIP BIPOLAR REPLACEMENT Left 10/18/2020    Procedure: HIP BIPOLAR ANTERIOR;  Surgeon: Juwan Hernandez MD;  Location: [facility];  Service: Orthopedics;  Laterality: Left;        MEDICATIONS:  I have reviewed and reconciled the patients medication list in the patients chart at the skilled nursing facility today.      ALLERGIES:  Allergies   Allergen Reactions   • Tetracyclines & Related Nausea Only     Unknown?       SOCIAL HISTORY:  Social History     Socioeconomic History   • Marital status:      Spouse name: Not on file   • Number of children: Not on file   • Years of education: Not on file   • Highest education level: Not on file   Tobacco Use   • Smoking status: Never Smoker   • Smokeless tobacco: Never Used   Substance and Sexual Activity   • Alcohol use: Not Currently    • Drug use: Not Currently   • Sexual activity: Defer     FAMILY HISTORY:  No family history on file.  REVIEW OF SYSTEMS:  Review of Systems   Constitutional: Negative for chills and fever.   HENT: Negative for congestion, sinus pressure and sore throat.    Eyes: Negative for blurred vision, pain and redness.   Respiratory: Negative for cough, chest tightness, shortness of breath and wheezing.    Cardiovascular: Negative for chest pain and palpitations.   Gastrointestinal: Negative for abdominal distention and abdominal pain.   Endocrine: Negative for cold intolerance and heat intolerance.   Genitourinary: Negative for dysuria, flank pain, hematuria and urgency.   Musculoskeletal: Positive for gait problem. Negative for arthralgias and myalgias.   Skin: Negative for rash, skin lesions and bruise.   Allergic/Immunologic: Negative for environmental allergies and food allergies.   Neurological: Positive for weakness and memory problem. Negative for dizziness, speech difficulty and numbness.   Hematological: Negative for adenopathy. Does not bruise/bleed easily.   Psychiatric/Behavioral: Negative for behavioral problems and stress. The patient is not nervous/anxious.      PHYSICAL EXAMINATION:   VITAL SIGNS: Temperature 97.9 pulse 79 respiratory rate 18 blood pressure 113/83 pain = 0  Physical Exam  Constitutional:       General: He is not in acute distress.  Cardiovascular:      Rate and Rhythm: Normal rate and regular rhythm.   Pulmonary:      Effort: Pulmonary effort is normal.      Breath sounds: Normal breath sounds.   Musculoskeletal:         General: No swelling, tenderness or deformity.   Neurological:      Comments: Awake and alert, talkative.       RECORDS REVIEW:   I have reviewed and interpreted any labs, xrays, and diagnostic tests available today.    ASSESSMENT   Diagnoses and all orders for this visit:    1. Nursing home resident (Primary)    2. Gait difficulty    3. Legal blindness    4. Dementia with  behavioral disturbance, unspecified dementia type (CMS/[facility])    5. Status post left hip replacement    PLAN  1.  Discussed Patient in detail with nursing/staff, addressed all needs today.     2.  Plan of Care Reviewed   3.  PT/OT/SLP Reviewed  4.  Order Changes   -No new orders, continue skilled rehab as ordered  5.  Discharge Plans Reviewed - No immediate plan to discharge.  6.  Advance Directive on file with nursing facility.   7.  POA on file with nursing facility.   8.  Code Status listed: []  Full Code   [x]  DNR     9.  Review labs, xrays, diagnostics in realtime as office receives results.    Note e-Signed by TARIQ Kang on 10/28/2020 at 14:59 CDT

## 2020-11-05 NOTE — TELEPHONE ENCOUNTER
Requested Prescriptions     Pending Prescriptions Disp Refills   • HYDROcodone-acetaminophen (NORCO) 5-325 MG per tablet 60 tablet 0     Sig: Take 1 tablet by mouth Every 4 (Four) Hours As Needed (Pain) for up to 10 days.

## 2020-11-09 NOTE — TELEPHONE ENCOUNTER
NH reported decline; hypotensive, briefly hypoxic    Moved to room family could visit    ro called wife; no hospitalization, ok with attempt at IV fluids and NH labs  Told he may die; tonight/soon.  She understood    Called She[facility];   Orders given CBC, CMP tonight   x 2 hrs; then 150 during night  Progress report AM (further orders on IV rate then)  Maybe hospice referral if alive tomorrow  Goal comfort

## 2020-11-10 NOTE — PROGRESS NOTES
"Subjective   Gabe Melendez is a 86 y.o. male presenting with chief complaint of:   Chief Complaint   Patient presents with   • Acute Renal Failure   • Hypotension   You have chosen to receive care through a telephone visit. Do you consent to use a telephone visit for your medical care today? Yes    This visit has been rescheduled as a phone visit to comply with patient safety concerns in accordance with CDC recommendations. Total time of discussion was 30 minutes.    History of Present Illness :  With wife.  Here for primarily an acute issue today; acute decline.       [facility] yesterday that he was hypotensive in the 70s and mottling; normal saline fluid bolus was ordered with continued fluids after that.  Stat labs were ordered.  When oxygen was placed and fluids were started his blood pressure improved and his initial low oxygen sats improved.  Conversation with wife; considering his quality of life she was comfortable with him not being hospitalized and doing what we did.    This morning we had staff conference; his blood pressure and oxygenation is now normal.  He remains more lethargic than usual; continues to be confused.  Labs were initially pending.    Labs have returned:  CBC is unremarkable  [facility] \"they called critical  lab values  Sodium 178.8  Chloride 154.4  .8  Creat 6.21    Contacted wife.  Told her  was in renal failure.  He might improve with fluids and he might not.  Nursing home was asked to get in and out cath and report if significant residual.  Building on last night's conversations and she having further time to consider she again states his quality of life and future is poor; she does not want him to live like this.  She is agreeable to hospice referral (and will be checking with her children in a stat manner); nursing home stands ready to make this referral.  At the same time we are transitioning his fluids from normal saline to half-normal " saline and getting in and out caths.  Comfort remains her primary concern; the staff says he seems comfortable now.    Has multiple chronic problems to consider that might have a bearing on today's issues; not an interval appointment.       Chronic/acute problems reviewed today:   1. Acute renal failure, unspecified acute renal failure type (CMS/HCC) : see above   2. Hypotension, unspecified hypotension type -last night; see above   3. Hypernatremia -acute; associated with lethargy   4. Dementia with behavioral disturbance, unspecified dementia type (CMS/HCC) chronic/slowly worsening over last several months per wife   5. Concern about end of life: at real risk to die     Has an/another acute issue today: none.    The following portions of the patient's history were reviewed and updated as appropriate: allergies, current medications, past family history, past medical history, past social history, past surgical history and problem list.      Current Outpatient Medications:   •  acetaminophen (TYLENOL) 325 MG tablet, Take 650 mg by mouth Every 4 (Four) Hours As Needed for Fever or Pain. Not to exceed 3 gm in 24 hr, Disp: , Rfl:   •  aluminum-magnesium hydroxide-simethicone (MAALOX/MYLANTA) 200-200-20 MG/5ML suspension, Take 30 mL by mouth Every 4 (Four) Hours As Needed for Indigestion., Disp: , Rfl:   •  apixaban (ELIQUIS) 2.5 MG tablet tablet, Take 1 tablet by mouth Every 12 (Twelve) Hours for 30 days. Indications: Prophylaxis of Venous Thromboembolism, Disp: 60 tablet, Rfl:   •  bisacodyl (DULCOLAX) 10 MG suppository, Insert 10 mg into the rectum Daily As Needed for Constipation. If no bm from MOM, Disp: , Rfl:   •  Divalproex Sodium (DEPAKOTE SPRINKLE) 125 MG capsule, Take 125 mg by mouth Every Morning., Disp: , Rfl:   •  Divalproex Sodium (DEPAKOTE SPRINKLE) 125 MG capsule, Take 250 mg by mouth Every Night., Disp: , Rfl:   •  docusate sodium (Colace) 100 MG capsule, Take 1 capsule by mouth 2 (Two) Times a Day.,  Disp: 60 capsule, Rfl: 1  •  docusate sodium 100 MG capsule, Take 1 capsule by mouth 2 (Two) Times a Day., Disp:  , Rfl:   •  HYDROcodone-acetaminophen (NORCO) 5-325 MG per tablet, Take 1 tablet by mouth Every 4 (Four) Hours As Needed (Pain) for up to 10 days., Disp: 60 tablet, Rfl: 0  •  ketoconazole (NIZORAL) 2 % shampoo, Apply 1 application topically to the appropriate area as directed Every 12 (Twelve) Hours As Needed for Dry Skin. To scalp for dry scalp, Disp: , Rfl:   •  magnesium hydroxide (MILK OF MAGNESIA) 400 MG/5ML suspension, Take 30 mL by mouth Daily As Needed for Constipation. If no bm in 3 days, Disp: , Rfl:   •  Multiple Vitamins-Minerals (multivitamin with minerals) tablet tablet, Take 1 tablet by mouth Daily., Disp: , Rfl:   •  ondansetron (Zofran) 4 MG tablet, Take 1 tablet by mouth Every 8 (Eight) Hours As Needed for Nausea or Vomiting., Disp: 20 tablet, Rfl: 1  •  potassium chloride (MICRO-K) 10 MEQ CR capsule, Take 2 capsules by mouth Daily., Disp: , Rfl:   •  travoprost, BAK free, (TRAVATAN) 0.004 % solution ophthalmic solution, Administer 1 drop to both eyes Every Evening., Disp: , Rfl:     No problems with medications prior  Refills if needed done    Allergies   Allergen Reactions   • Tetracyclines & Related Nausea Only     Unknown?       Review of Systems  Unable due to dementia, lethargy    Lab Results:  Results for orders placed or performed during the hospital encounter of 10/17/20   COVID-19,[facility] IN-HOUSE,Nasal Swab No Transport Media 3-4 HR TAT - Swab, Nasal Cavity    Specimen: Nasal Cavity; Swab   Result Value Ref Range    COVID19 Not Detected Not Detected - Ref. Range   COVID-19,[facility] IN-HOUSE,Nasal Swab No Transport Media 3-4 HR TAT - Swab, Nasal Cavity    Specimen: Nasal Cavity; Swab   Result Value Ref Range    COVID19 Not Detected Not Detected - Ref. Range   BNP    Specimen: Blood   Result Value Ref Range    proBNP 436.3 0.0-1,800.0 pg/mL   CBC Auto Differential     Specimen: Blood   Result Value Ref Range    WBC 16.32 (H) 3.40 - 10.80 10*3/mm3    RBC 4.55 4.14 - 5.80 10*6/mm3    Hemoglobin 14.6 13.0 - 17.7 g/dL    Hematocrit 43.9 37.5 - 51.0 %    MCV 96.5 79.0 - 97.0 fL    MCH 32.1 26.6 - 33.0 pg    MCHC 33.3 31.5 - 35.7 g/dL    RDW 13.2 12.3 - 15.4 %    RDW-SD 47.4 37.0 - 54.0 fl    MPV 12.0 6.0 - 12.0 fL    Platelets 167 140 - 450 10*3/mm3    Neutrophil % 84.9 (H) 42.7 - 76.0 %    Lymphocyte % 5.6 (L) 19.6 - 45.3 %    Monocyte % 8.3 5.0 - 12.0 %    Eosinophil % 0.1 (L) 0.3 - 6.2 %    Basophil % 0.2 0.0 - 1.5 %    Immature Grans % 0.9 (H) 0.0 - 0.5 %    Neutrophils, Absolute 13.85 (H) 1.70 - 7.00 10*3/mm3    Lymphocytes, Absolute 0.91 0.70 - 3.10 10*3/mm3    Monocytes, Absolute 1.36 (H) 0.10 - 0.90 10*3/mm3    Eosinophils, Absolute 0.01 0.00 - 0.40 10*3/mm3    Basophils, Absolute 0.04 0.00 - 0.20 10*3/mm3    Immature Grans, Absolute 0.15 (H) 0.00 - 0.05 10*3/mm3    nRBC 0.0 0.0 - 0.2 /100 WBC   Comprehensive Metabolic Panel    Specimen: Blood   Result Value Ref Range    Glucose 123 (H) 65 - 99 mg/dL    BUN 18 8 - 23 mg/dL    Creatinine 0.84 0.76 - 1.27 mg/dL    Sodium 134 (L) 136 - 145 mmol/L    Potassium 4.1 3.5 - 5.2 mmol/L    Chloride 104 98 - 107 mmol/L    CO2 19.0 (L) 22.0 - 29.0 mmol/L    Calcium 9.4 8.6 - 10.5 mg/dL    Total Protein 7.0 6.0 - 8.5 g/dL    Albumin 4.00 3.50 - 5.20 g/dL    ALT (SGPT) 19 1 - 41 U/L    AST (SGOT) 28 1 - 40 U/L    Alkaline Phosphatase 82 39 - 117 U/L    Total Bilirubin 0.5 0.0 - 1.2 mg/dL    eGFR Non African Amer 87 >60 mL/min/1.73    Globulin 3.0 gm/dL    A/G Ratio 1.3 g/dL    BUN/Creatinine Ratio 21.4 7.0 - 25.0    Anion Gap 11.0 5.0 - 15.0 mmol/L   TSH    Specimen: Blood   Result Value Ref Range    TSH 2.510 0.270 - 4.200 uIU/mL   Blood Gas, Arterial    Specimen: Arterial Blood   Result Value Ref Range    Site Left Brachial     Edu's Test N/A     pH, Arterial 7.459 (H) 7.350 - 7.450 pH units    pCO2, Arterial 31.0 (L) 35.0 - 45.0 mm Hg     pO2, Arterial 85.8 83.0 - 108.0 mm Hg    HCO3, Arterial 22.0 20.0 - 26.0 mmol/L    Base Excess, Arterial -0.9 (L) 0.0 - 2.0 mmol/L    O2 Saturation, Arterial 98.0 94.0 - 99.0 %    Temperature 37.0 C    Barometric Pressure for Blood Gas 757 mmHg    Modality Room Air     FIO2 21 %    Ventilator Mode NA     Collected by [id number]     pCO2, Temperature Corrected 31.0 (L) 35 - 45 mm Hg    pH, Temp Corrected 7.459 (H) 7.350 - 7.450 pH Units    pO2, Temperature Corrected 85.8 83 - 108 mm Hg   Urinalysis With Microscopic If Indicated (No Culture) - Urine, Clean Catch    Specimen: Urine, Clean Catch   Result Value Ref Range    Color, UA Yellow Yellow, Straw    Appearance, UA Clear Clear    pH, UA 6.0 5.0 - 8.0    Specific Gravity, UA 1.019 1.005 - 1.030    Glucose, UA Negative Negative    Ketones, UA 15 mg/dL (1+) (A) Negative    Bilirubin, UA Negative Negative    Blood, UA Negative Negative    Protein, UA Trace (A) Negative    Leuk Esterase, UA Negative Negative    Nitrite, UA Negative Negative    Urobilinogen, UA 1.0 E.U./dL 0.2 - 1.0 E.U./dL   Basic Metabolic Panel    Specimen: Blood   Result Value Ref Range    Glucose 120 (H) 65 - 99 mg/dL    BUN 15 8 - 23 mg/dL    Creatinine 0.95 0.76 - 1.27 mg/dL    Sodium 137 136 - 145 mmol/L    Potassium 4.2 3.5 - 5.2 mmol/L    Chloride 106 98 - 107 mmol/L    CO2 23.0 22.0 - 29.0 mmol/L    Calcium 9.4 8.6 - 10.5 mg/dL    eGFR Non African Amer 75 >60 mL/min/1.73    BUN/Creatinine Ratio 15.8 7.0 - 25.0    Anion Gap 8.0 5.0 - 15.0 mmol/L   CBC Auto Differential    Specimen: Blood   Result Value Ref Range    WBC 13.86 (H) 3.40 - 10.80 10*3/mm3    RBC 4.31 4.14 - 5.80 10*6/mm3    Hemoglobin 13.8 13.0 - 17.7 g/dL    Hematocrit 41.7 37.5 - 51.0 %    MCV 96.8 79.0 - 97.0 fL    MCH 32.0 26.6 - 33.0 pg    MCHC 33.1 31.5 - 35.7 g/dL    RDW 13.6 12.3 - 15.4 %    RDW-SD 48.2 37.0 - 54.0 fl    MPV 11.2 6.0 - 12.0 fL    Platelets 189 140 - 450 10*3/mm3    Neutrophil % 81.7 (H) 42.7 - 76.0 %     Lymphocyte % 7.6 (L) 19.6 - 45.3 %    Monocyte % 9.9 5.0 - 12.0 %    Eosinophil % 0.2 (L) 0.3 - 6.2 %    Basophil % 0.2 0.0 - 1.5 %    Immature Grans % 0.4 0.0 - 0.5 %    Neutrophils, Absolute 11.31 (H) 1.70 - 7.00 10*3/mm3    Lymphocytes, Absolute 1.06 0.70 - 3.10 10*3/mm3    Monocytes, Absolute 1.37 (H) 0.10 - 0.90 10*3/mm3    Eosinophils, Absolute 0.03 0.00 - 0.40 10*3/mm3    Basophils, Absolute 0.03 0.00 - 0.20 10*3/mm3    Immature Grans, Absolute 0.06 (H) 0.00 - 0.05 10*3/mm3    nRBC 0.0 0.0 - 0.2 /100 WBC   Basic Metabolic Panel    Specimen: Blood   Result Value Ref Range    Glucose 108 (H) 65 - 99 mg/dL    BUN 19 8 - 23 mg/dL    Creatinine 1.10 0.76 - 1.27 mg/dL    Sodium 138 136 - 145 mmol/L    Potassium 3.5 3.5 - 5.2 mmol/L    Chloride 110 (H) 98 - 107 mmol/L    CO2 19.0 (L) 22.0 - 29.0 mmol/L    Calcium 9.2 8.6 - 10.5 mg/dL    eGFR Non African Amer 63 >60 mL/min/1.73    BUN/Creatinine Ratio 17.3 7.0 - 25.0    Anion Gap 9.0 5.0 - 15.0 mmol/L   CBC Auto Differential    Specimen: Blood   Result Value Ref Range    WBC 12.23 (H) 3.40 - 10.80 10*3/mm3    RBC 3.69 (L) 4.14 - 5.80 10*6/mm3    Hemoglobin 11.9 (L) 13.0 - 17.7 g/dL    Hematocrit 35.5 (L) 37.5 - 51.0 %    MCV 96.2 79.0 - 97.0 fL    MCH 32.2 26.6 - 33.0 pg    MCHC 33.5 31.5 - 35.7 g/dL    RDW 13.6 12.3 - 15.4 %    RDW-SD 47.8 37.0 - 54.0 fl    MPV 10.8 6.0 - 12.0 fL    Platelets 159 140 - 450 10*3/mm3    Neutrophil % 79.8 (H) 42.7 - 76.0 %    Lymphocyte % 6.5 (L) 19.6 - 45.3 %    Monocyte % 12.8 (H) 5.0 - 12.0 %    Eosinophil % 0.0 (L) 0.3 - 6.2 %    Basophil % 0.2 0.0 - 1.5 %    Immature Grans % 0.7 (H) 0.0 - 0.5 %    Neutrophils, Absolute 9.75 (H) 1.70 - 7.00 10*3/mm3    Lymphocytes, Absolute 0.80 0.70 - 3.10 10*3/mm3    Monocytes, Absolute 1.57 (H) 0.10 - 0.90 10*3/mm3    Eosinophils, Absolute 0.00 0.00 - 0.40 10*3/mm3    Basophils, Absolute 0.02 0.00 - 0.20 10*3/mm3    Immature Grans, Absolute 0.09 (H) 0.00 - 0.05 10*3/mm3    nRBC 0.0 0.0 - 0.2  /100 WBC   Basic Metabolic Panel    Specimen: Blood   Result Value Ref Range    Glucose 131 (H) 65 - 99 mg/dL    BUN 22 8 - 23 mg/dL    Creatinine 1.00 0.76 - 1.27 mg/dL    Sodium 143 136 - 145 mmol/L    Potassium 3.4 (L) 3.5 - 5.2 mmol/L    Chloride 111 (H) 98 - 107 mmol/L    CO2 22.0 22.0 - 29.0 mmol/L    Calcium 9.4 8.6 - 10.5 mg/dL    eGFR Non African Amer 71 >60 mL/min/1.73    BUN/Creatinine Ratio 22.0 7.0 - 25.0    Anion Gap 10.0 5.0 - 15.0 mmol/L   CBC Auto Differential    Specimen: Blood   Result Value Ref Range    WBC 11.80 (H) 3.40 - 10.80 10*3/mm3    RBC 4.03 (L) 4.14 - 5.80 10*6/mm3    Hemoglobin 13.0 13.0 - 17.7 g/dL    Hematocrit 38.6 37.5 - 51.0 %    MCV 95.8 79.0 - 97.0 fL    MCH 32.3 26.6 - 33.0 pg    MCHC 33.7 31.5 - 35.7 g/dL    RDW 13.6 12.3 - 15.4 %    RDW-SD 48.0 37.0 - 54.0 fl    MPV 10.6 6.0 - 12.0 fL    Platelets 185 140 - 450 10*3/mm3    Neutrophil % 74.7 42.7 - 76.0 %    Lymphocyte % 10.6 (L) 19.6 - 45.3 %    Monocyte % 12.4 (H) 5.0 - 12.0 %    Eosinophil % 1.5 0.3 - 6.2 %    Basophil % 0.2 0.0 - 1.5 %    Immature Grans % 0.6 (H) 0.0 - 0.5 %    Neutrophils, Absolute 8.82 (H) 1.70 - 7.00 10*3/mm3    Lymphocytes, Absolute 1.25 0.70 - 3.10 10*3/mm3    Monocytes, Absolute 1.46 (H) 0.10 - 0.90 10*3/mm3    Eosinophils, Absolute 0.18 0.00 - 0.40 10*3/mm3    Basophils, Absolute 0.02 0.00 - 0.20 10*3/mm3    Immature Grans, Absolute 0.07 (H) 0.00 - 0.05 10*3/mm3    nRBC 0.0 0.0 - 0.2 /100 WBC   Adult Transthoracic Echo Complete W/ Cont if Necessary Per Protocol   Result Value Ref Range    BSA 1.8 m^2    RVIDd 3.7 cm    IVSd 0.87 cm    LVIDd 4.3 cm    LVIDs 3.0 cm    LVPWd 0.68 cm    IVS/LVPW 1.3     FS 30.3 %    EDV(Teich) 84.4 ml    ESV(Teich) 35.6 ml    EF(Teich) 57.9 %    EDV(cubed) 81.2 ml    ESV(cubed) 27.5 ml    EF(cubed) 66.1 %    LV mass(C)d 101.8 grams    LV mass(C)dI 55.6 grams/m^2    SV(Teich) 48.9 ml    SI(Teich) 26.7 ml/m^2    SV(cubed) 53.6 ml    SI(cubed) 29.3 ml/m^2    Ao root diam  3.6 cm    Ao root area 10.2 cm^2    LA dimension 3.5 cm    LA/Ao 0.97     LVOT diam 2.0 cm    LVOT area 3.1 cm^2    LVOT area(traced) 3.1 cm^2    LVLd ap4 7.3 cm    EDV(MOD-sp4) 63.3 ml    LVLs ap4 6.3 cm    ESV(MOD-sp4) 23.0 ml    EF(MOD-sp4) 63.7 %    SV(MOD-sp4) 40.3 ml    SI(MOD-sp4) 22.0 ml/m^2    Ao root area (BSA corrected) 2.0     LV Valentine Vol (BSA corrected) 34.6 ml/m^2    LV Sys Vol (BSA corrected) 12.6 ml/m^2    MV E max bertin 44.1 cm/sec    MV A max bertin 78.6 cm/sec    MV E/A 0.56     MV P1/2t max bertin 53.5 cm/sec    MV P1/2t 31.8 msec    MVA(P1/2t) 6.9 cm^2    MV dec slope 492.5 cm/sec^2    MV dec time 0.11 sec    Ao pk bertin 104.0 cm/sec    Ao max PG 4.3 mmHg    Ao max PG (full) 0.75 mmHg    Ao V2 mean 66.3 cm/sec    Ao mean PG 2.0 mmHg    Ao mean PG (full) 0 mmHg    Ao V2 VTI 15.6 cm    CORWIN(I,A) 2.8 cm^2    CORWIN(I,D) 2.8 cm^2    CORWIN(V,A) 2.9 cm^2    CORWIN(V,D) 2.9 cm^2    LV V1 max PG 3.6 mmHg    LV V1 mean PG 2.0 mmHg    LV V1 max 94.6 cm/sec    LV V1 mean 60.3 cm/sec    LV V1 VTI 14.1 cm    SV(Ao) 158.8 ml    SI(Ao) 86.8 ml/m^2    SV(LVOT) 44.3 ml    SI(LVOT) 24.2 ml/m^2    PA V2 max 167.0 cm/sec    PA max PG 11.2 mmHg    MVA P1/2T LCG 4.1 cm^2    BH CV ECHO JOSE - BZI_BMI 23.4 kilograms/m^2    BH CV ECHO JOSE - BSA(AILYN) 1.8 m^2     CV ECHO JOSE - BZI_METRIC_WEIGHT 69.9 kg     CV ECHO JOSE - BZI_METRIC_HEIGHT 172.7 cm    LA Volume Index 28.5 mL/m2    Avg E/e' ratio 6.17     Lat Peak E' Bertin 6.3 cm/sec    Med Peak E' Bertin 8.00 cm/sec   Type & Screen    Specimen: Blood   Result Value Ref Range    ABO Type A     RH type Positive     Antibody Screen Negative     T&S Expiration Date 10/20/2020 11:59:59 PM        A1C:No results for input(s): HGBA1C in the last 96500 hours.  PSA:No results for input(s): PSA in the last 87114 hours.  CBC:  Lab Results - Last 18 Months   Lab Units 10/20/20  0447 10/19/20  0606 10/18/20  0434 10/17/20  1149   WBC 10*3/mm3 11.80* 12.23* 13.86* 16.32*   HEMOGLOBIN g/dL 13.0 11.9*  13.8 14.6   HEMATOCRIT % 38.6 35.5* 41.7 43.9   PLATELETS 10*3/mm3 185 159 189 167      BMP/CMP:  Lab Results - Last 18 Months   Lab Units 10/20/20  0447 10/19/20  0606 10/18/20  0434 10/17/20  1149   SODIUM mmol/L 143 138 137 134*   POTASSIUM mmol/L 3.4* 3.5 4.2 4.1   CHLORIDE mmol/L 111* 110* 106 104   CO2 mmol/L 22.0 19.0* 23.0 19.0*   BUN mg/dL 22 19 15 18   CREATININE mg/dL 1.00 1.10 0.95 0.84   EGFR IF NONAFRICN AM mL/min/1.73 71 63 75 87   CALCIUM mg/dL 9.4 9.2 9.4 9.4     HEPATIC:  Lab Results - Last 18 Months   Lab Units 10/17/20  1149   ALT (SGPT) U/L 19   AST (SGOT) U/L 28   ALK PHOS U/L 82     THYROID:  Lab Results - Last 18 Months   Lab Units 10/17/20  1149   TSH uIU/mL 2.510       Objective   There were no vitals taken for this visit.  There is no height or weight on file to calculate BMI.    Physical Exam  None; due to telephone/COVID19  No verbalization from patient    Assessment/Plan     1. Acute renal failure, unspecified acute renal failure type (CMS/HCC)    2. Hypotension, unspecified hypotension type    3. Hypernatremia    4. Dementia with behavioral disturbance, unspecified dementia type (CMS/HCC)    5. Concern about end of life        Rx: reviewed/changes:  No orders of the defined types were placed in this encounter.      LAB/Testing/Referrals: reviewed/orders:   Today: no more  No orders of the defined types were placed in this encounter.    Chronic/recurrent labs above or change to:   same     Discussions:   Probably moving towards hospice if the wife significant family get the formal okay  Fluids above is noted  In and out cath is noted  But may end up stopping even the fluids and moving towards hospice care      There are no Patient Instructions on file for this visit.    Follow up: No follow-ups on file.  No future appointments.

## 2020-11-10 NOTE — TELEPHONE ENCOUNTER
See note for today      Current Outpatient Medications:   •  acetaminophen (TYLENOL) 325 MG tablet, Take 650 mg by mouth Every 4 (Four) Hours As Needed for Fever or Pain. Not to exceed 3 gm in 24 hr, Disp: , Rfl:   •  aluminum-magnesium hydroxide-simethicone (MAALOX/MYLANTA) 200-200-20 MG/5ML suspension, Take 30 mL by mouth Every 4 (Four) Hours As Needed for Indigestion., Disp: , Rfl:   •  apixaban (ELIQUIS) 2.5 MG tablet tablet, Take 1 tablet by mouth Every 12 (Twelve) Hours for 30 days. Indications: Prophylaxis of Venous Thromboembolism, Disp: 60 tablet, Rfl:   •  bisacodyl (DULCOLAX) 10 MG suppository, Insert 10 mg into the rectum Daily As Needed for Constipation. If no bm from MOM, Disp: , Rfl:   •  Divalproex Sodium (DEPAKOTE SPRINKLE) 125 MG capsule, Take 125 mg by mouth Every Morning., Disp: , Rfl:   •  Divalproex Sodium (DEPAKOTE SPRINKLE) 125 MG capsule, Take 250 mg by mouth Every Night., Disp: , Rfl:   •  docusate sodium (Colace) 100 MG capsule, Take 1 capsule by mouth 2 (Two) Times a Day., Disp: 60 capsule, Rfl: 1  •  docusate sodium 100 MG capsule, Take 1 capsule by mouth 2 (Two) Times a Day., Disp:  , Rfl:   •  HYDROcodone-acetaminophen (NORCO) 5-325 MG per tablet, Take 1 tablet by mouth Every 4 (Four) Hours As Needed (Pain) for up to 10 days., Disp: 60 tablet, Rfl: 0  •  ketoconazole (NIZORAL) 2 % shampoo, Apply 1 application topically to the appropriate area as directed Every 12 (Twelve) Hours As Needed for Dry Skin. To scalp for dry scalp, Disp: , Rfl:   •  magnesium hydroxide (MILK OF MAGNESIA) 400 MG/5ML suspension, Take 30 mL by mouth Daily As Needed for Constipation. If no bm in 3 days, Disp: , Rfl:   •  Multiple Vitamins-Minerals (multivitamin with minerals) tablet tablet, Take 1 tablet by mouth Daily., Disp: , Rfl:   •  ondansetron (Zofran) 4 MG tablet, Take 1 tablet by mouth Every 8 (Eight) Hours As Needed for Nausea or Vomiting., Disp: 20 tablet, Rfl: 1  •  potassium chloride (MICRO-K) 10 MEQ  "CR capsule, Take 2 capsules by mouth Daily., Disp: , Rfl:   •  travoprost, DEDE free, (TRAVATAN) 0.004 % solution ophthalmic solution, Administer 1 drop to both eyes Every Evening., Disp: , Rfl:     [facility] \"they called critical  lab values  Sodium 178.8  Chloride 154.4  .8  Creat 6.21    I will fax them over when they send them to me\"    Advised will inform Dr Shetty  "

## (undated) DEVICE — PK HIP TOTL ANT 30

## (undated) DEVICE — SYS SKIN CLS DERMABOND PRINEO W/22CM MESH TP

## (undated) DEVICE — SPK10183 ORTHOPEDIC FRACTURE AND TRAUMA KIT: Brand: SPK10183 ORTHOPEDIC FRACTURE AND TRAUMA KIT

## (undated) DEVICE — 4-PORT MANIFOLD: Brand: NEPTUNE 2

## (undated) DEVICE — GLV SURG DERMASSURE GRN LF PF 8.5

## (undated) DEVICE — ANTIBACTERIAL UNDYED BRAIDED (POLYGLACTIN 910), SYNTHETIC ABSORBABLE SUTURE: Brand: COATED VICRYL

## (undated) DEVICE — OPTIFOAM GENTLE SA, POSTOP, 4X8: Brand: MEDLINE

## (undated) DEVICE — SHORT LENS-STERILE

## (undated) DEVICE — CLTH CLENS READYCLEANSE PERI CARE PK/5

## (undated) DEVICE — GLV SURG PREMIERPRO ORTHO LTX PF SZ8.5 BRN

## (undated) DEVICE — PENCL E/S PLUMEPEN 9.5MM 10FT LF

## (undated) DEVICE — CVR BRD ARM 13X30

## (undated) DEVICE — DUAL CUT SAGITTAL BLADE

## (undated) DEVICE — PK TURNOVER RM ADV

## (undated) DEVICE — SCINTILLANT SURGICAL LIGHT WITH SUCTION: Brand: SCINTILLANT